# Patient Record
Sex: FEMALE | Race: WHITE | Employment: UNEMPLOYED
[De-identification: names, ages, dates, MRNs, and addresses within clinical notes are randomized per-mention and may not be internally consistent; named-entity substitution may affect disease eponyms.]

---

## 2017-01-18 ENCOUNTER — OFFICE VISIT (OUTPATIENT)
Dept: OBGYN | Facility: CLINIC | Age: 22
End: 2017-01-18

## 2017-01-18 VITALS
SYSTOLIC BLOOD PRESSURE: 115 MMHG | BODY MASS INDEX: 29.95 KG/M2 | HEIGHT: 63 IN | WEIGHT: 169 LBS | HEART RATE: 78 BPM | DIASTOLIC BLOOD PRESSURE: 76 MMHG

## 2017-01-18 DIAGNOSIS — Z30.41 FAMILY PLANNING, BCP (BIRTH CONTROL PILLS) MAINTENANCE: Primary | ICD-10-CM

## 2017-01-18 PROCEDURE — 99212 OFFICE O/P EST SF 10 MIN: CPT | Performed by: NURSE PRACTITIONER

## 2017-01-18 RX ORDER — NORETHINDRONE ACETATE AND ETHINYL ESTRADIOL 1; .02 MG/1; MG/1
1 TABLET ORAL DAILY
Qty: 21 TABLET | Refills: 12 | Status: SHIPPED | OUTPATIENT
Start: 2017-01-18 | End: 2018-01-25 | Stop reason: SDUPTHER

## 2018-01-25 DIAGNOSIS — Z30.41 FAMILY PLANNING, BCP (BIRTH CONTROL PILLS) MAINTENANCE: ICD-10-CM

## 2018-01-25 RX ORDER — NORETHINDRONE ACETATE AND ETHINYL ESTRADIOL 1; .02 MG/1; MG/1
1 TABLET ORAL DAILY
Qty: 21 TABLET | Refills: 0 | Status: SHIPPED | OUTPATIENT
Start: 2018-01-25 | End: 2018-02-02 | Stop reason: SDUPTHER

## 2018-02-02 ENCOUNTER — OFFICE VISIT (OUTPATIENT)
Dept: OBGYN CLINIC | Age: 23
End: 2018-02-02
Payer: MEDICARE

## 2018-02-02 VITALS
HEART RATE: 76 BPM | SYSTOLIC BLOOD PRESSURE: 122 MMHG | DIASTOLIC BLOOD PRESSURE: 80 MMHG | WEIGHT: 221 LBS | BODY MASS INDEX: 39.16 KG/M2 | HEIGHT: 63 IN

## 2018-02-02 DIAGNOSIS — Z01.419 WELL WOMAN EXAM: Primary | ICD-10-CM

## 2018-02-02 DIAGNOSIS — Z30.41 FAMILY PLANNING, BCP (BIRTH CONTROL PILLS) MAINTENANCE: ICD-10-CM

## 2018-02-02 PROCEDURE — 99395 PREV VISIT EST AGE 18-39: CPT | Performed by: NURSE PRACTITIONER

## 2018-02-02 RX ORDER — NORETHINDRONE ACETATE AND ETHINYL ESTRADIOL 1; .02 MG/1; MG/1
1 TABLET ORAL DAILY
Qty: 28 TABLET | Refills: 11 | Status: SHIPPED | OUTPATIENT
Start: 2018-02-02 | End: 2018-12-31 | Stop reason: SDUPTHER

## 2018-02-02 ASSESSMENT — PATIENT HEALTH QUESTIONNAIRE - PHQ9
SUM OF ALL RESPONSES TO PHQ QUESTIONS 1-9: 0
2. FEELING DOWN, DEPRESSED OR HOPELESS: 0
SUM OF ALL RESPONSES TO PHQ9 QUESTIONS 1 & 2: 0
1. LITTLE INTEREST OR PLEASURE IN DOING THINGS: 0

## 2018-02-02 NOTE — PROGRESS NOTES
Grandfather     Diabetes Paternal Grandmother     Diabetes Maternal Grandmother     COPD Maternal Grandmother     Hypertension Maternal Grandmother     Diabetes Maternal Grandfather     Coronary Art Dis Maternal Grandfather      Social History     Social History    Marital status: Single     Spouse name: N/A    Number of children: N/A    Years of education: N/A     Occupational History    Not on file. Social History Main Topics    Smoking status: Never Smoker    Smokeless tobacco: Never Used    Alcohol use No    Drug use: No    Sexual activity: Yes     Partners: Male     Other Topics Concern    Not on file     Social History Narrative    No narrative on file       MEDICATIONS:  Current Outpatient Prescriptions   Medication Sig Dispense Refill    norethindrone-ethinyl estradiol (MICROGESTIN) 1-20 MG-MCG per tablet Take 1 tablet by mouth daily 28 tablet 11    Multiple Vitamins-Calcium (ONE-A-DAY WOMENS PO) Take by mouth       No current facility-administered medications for this visit. ALLERGIES:  Allergies as of 02/02/2018    (No Known Allergies)       Symptoms of decreased mood absent  Symptoms of anhedonia absent    **If either question is answered in a  positive fashion then complete the PHQ9 Scoring Evaluation and make the appropriate referral**      Immunization status: stated as current, but no records available. Gynecologic History:  Menarche: 13 yo  Menopause at [address] [facility] yo     Patient's last menstrual period was 01/04/2018 (exact date). Sexually Active: Yes    STD History: Yes chlamydia X 1     Permanent Sterilization: No   Reversible Birth Control: Yes OCPs        Hormone Replacement Exposure: No      Genetic Qualified Family History of Breast, Ovarian , Colon or Uterine Cancer: No     If YES see scanned worksheet.     Preventative Health Testing:    Health Maintenance:  Health Maintenance Due   Topic Date Due    DTaP/Tdap/Td vaccine (1 - Tdap) 09/20/2012    Chlamydia screen  07/11/2014    Flu vaccine (1) 09/01/2017    Cervical cancer screen  10/13/2017       Date of Last Pap Smear: 10/2016 negative  Abnormal Pap Smear History: denies  Colposcopy History:   Date of Last Mammogram: NA  Date of Last Colonoscopy:   Date of Last Bone Density:      ________________________________________________________________________        REVIEW OF SYSTEMS:    yes   A minimum of an eleven point review of systems was completed. Review Of Systems (11 point):  Constitutional: No fever, chills or malaise; No weight change or fatigue  Head and Eyes: No vision, Headache, Dizziness or trauma in last 12 months  ENT ROS: No hearing, Tinnitis, sinus or taste problems  Hematological and Lymphatic ROS:No Lymphoma, Von Willebrand's, Hemophillia or Bleeding History  Psych ROS: No Depression, Homicidal thoughts,suicidal thoughts, or anxiety  Breast ROS: No prior breast abnormalities or lumps  Respiratory ROS: No SOB, Pneumoniae,Cough, or Pulmonary Embolism History  Cardiovascular ROS: No Chest Pain with Exertion, Palpitations, Syncope, Edema, Arrhythmia  Gastrointestinal ROS: No Indigestion, Heartburn, Nausea, vomiting, Diarrhea, Constipation,or Bowel Changes; No Bloody Stools or melena  Genito-Urinary ROS: No Dysuria, Hematuria or Nocturia.  No Urinary Incontinence or Vaginal Discharge  Musculoskeletal ROS: No Arthralgia, Arthritis,Gout,Osteoporosis or Rheumatism  Neurological ROS: No CVA, Migraines, Epilepsy, Seizure Hx, or Limb Weakness  Dermatological ROS: No Rash, Itching, Hives, Mole Changes or Cancer                                                                                                                                                                                                                                  PHYSICAL Exam:     Constitutional:  Vitals:    02/02/18 1137   BP: 122/80   Site: Right Arm   Position: Sitting   Cuff Size: Medium Adult   Pulse: 76   Weight: 221 lb (100.2 kg) Bimanual exam reveals normal uterus and adnexa. Rectal Exam:  exam declined by patient          Musculosk:  Normal Gait and station was noted. Digits were evaluated without abnormal findings. Range of motion, stability and strength were evaluated and found to be appropriate for the patients age. ASSESSMENT:      23 y.o. Annual  1. Well woman exam     2. Family planning, BCP (birth control pills) maintenance  norethindrone-ethinyl estradiol (Perlie Graven) 1-20 MG-MCG per tablet          Chief Complaint   Patient presents with    Gynecologic Exam          Past Medical History:   Diagnosis Date    Headache     History of chlamydia     Hyperglycemia     Obesity          Patient Active Problem List    Diagnosis Date Noted    H/O:  2013     Priority: High     Repeat C/S  Low transverse      Type O blood, Rh negative 2012     Priority: High    Headache     Hyperglycemia     Obesity     Low Platelet Count 2013     Platelets 623 on  repeat in 1 month  PFA wnl            Hereditary Breast, Ovarian, Colon and Uterine Cancer screening Done. Tobacco & Secondary smoke risks reviewed; instructed on cessation and avoidance      Counseling Completed:  Preventative Health Recommendations and Follow up. The patient was informed of the recommended preventative health recommendations. 1. Annuals every year; Cytology collections per prevailing guidelines. 2. Mammograms begin every year at 35 yo if no abnormalities are found and no family     History. 3. Bone density studies every 2-3 years. Begin at 73 yo. If no fracture history or osteoporosis family history. (significant). 4. Colonoscopy begin at 49 yo. Repeat every ten years if negative and no family history. 5. Calcium of 0320-7071 mg/day in split dosing  6. Vitamin D 400-800 IU/day  7.  All other preventative health recommendations will be managed by the patients Primary care

## 2018-02-09 ENCOUNTER — HOSPITAL ENCOUNTER (OUTPATIENT)
Age: 23
Setting detail: SPECIMEN
Discharge: HOME | End: 2018-02-09
Payer: MEDICARE

## 2018-02-09 ENCOUNTER — OFFICE VISIT (OUTPATIENT)
Dept: FAMILY MEDICINE CLINIC | Age: 23
End: 2018-02-09
Payer: MEDICARE

## 2018-02-09 VITALS
HEIGHT: 63 IN | SYSTOLIC BLOOD PRESSURE: 115 MMHG | TEMPERATURE: 98.2 F | DIASTOLIC BLOOD PRESSURE: 82 MMHG | WEIGHT: 223 LBS | BODY MASS INDEX: 39.51 KG/M2 | HEART RATE: 97 BPM | OXYGEN SATURATION: 98 %

## 2018-02-09 DIAGNOSIS — Z00.00 PREVENTATIVE HEALTH CARE: ICD-10-CM

## 2018-02-09 DIAGNOSIS — H53.9 VISUAL DISTURBANCE: ICD-10-CM

## 2018-02-09 DIAGNOSIS — E66.9 OBESITY, CLASS II, BMI 35-39.9: Primary | ICD-10-CM

## 2018-02-09 DIAGNOSIS — Z23 NEED FOR TETANUS BOOSTER: ICD-10-CM

## 2018-02-09 DIAGNOSIS — Z83.3 FAMILY HISTORY OF DIABETES MELLITUS: ICD-10-CM

## 2018-02-09 PROBLEM — E66.812 OBESITY, CLASS II, BMI 35-39.9: Status: ACTIVE | Noted: 2018-02-09

## 2018-02-09 LAB
ALBUMIN SERPL-MCNC: 4.3 G/DL (ref 3.5–5.2)
ALBUMIN/GLOBULIN RATIO: 1.6 (ref 1–2.5)
ALP BLD-CCNC: 66 U/L (ref 35–104)
ALT SERPL-CCNC: 13 U/L (ref 5–33)
ANION GAP SERPL CALCULATED.3IONS-SCNC: 13 MMOL/L (ref 9–17)
AST SERPL-CCNC: 15 U/L
BILIRUB SERPL-MCNC: 0.24 MG/DL (ref 0.3–1.2)
BUN BLDV-MCNC: 7 MG/DL (ref 6–20)
BUN/CREAT BLD: ABNORMAL (ref 9–20)
CALCIUM SERPL-MCNC: 9.3 MG/DL (ref 8.6–10.4)
CHLORIDE BLD-SCNC: 104 MMOL/L (ref 98–107)
CO2: 26 MMOL/L (ref 20–31)
CREAT SERPL-MCNC: 0.44 MG/DL (ref 0.5–0.9)
GFR AFRICAN AMERICAN: >60 ML/MIN
GFR NON-AFRICAN AMERICAN: >60 ML/MIN
GFR SERPL CREATININE-BSD FRML MDRD: ABNORMAL ML/MIN/{1.73_M2}
GFR SERPL CREATININE-BSD FRML MDRD: ABNORMAL ML/MIN/{1.73_M2}
GLUCOSE BLD-MCNC: 79 MG/DL (ref 70–99)
HBV SURFACE AB TITR SER: 315.4 MIU/ML
HCT VFR BLD CALC: 42.6 % (ref 36.3–47.1)
HEMOGLOBIN: 13.8 G/DL (ref 11.9–15.1)
MCH RBC QN AUTO: 27.4 PG (ref 25.2–33.5)
MCHC RBC AUTO-ENTMCNC: 32.4 G/DL (ref 28.4–34.8)
MCV RBC AUTO: 84.5 FL (ref 82.6–102.9)
NRBC AUTOMATED: 0 PER 100 WBC
PDW BLD-RTO: 13.2 % (ref 11.8–14.4)
PLATELET # BLD: 242 K/UL (ref 138–453)
PMV BLD AUTO: 10.4 FL (ref 8.1–13.5)
POTASSIUM SERPL-SCNC: 4.3 MMOL/L (ref 3.7–5.3)
RBC # BLD: 5.04 M/UL (ref 3.95–5.11)
RUBV IGG SER QL: 105.3 IU/ML
SODIUM BLD-SCNC: 143 MMOL/L (ref 135–144)
TOTAL PROTEIN: 7 G/DL (ref 6.4–8.3)
WBC # BLD: 8.5 K/UL (ref 3.5–11.3)

## 2018-02-09 PROCEDURE — 90715 TDAP VACCINE 7 YRS/> IM: CPT | Performed by: NURSE PRACTITIONER

## 2018-02-09 PROCEDURE — 90471 IMMUNIZATION ADMIN: CPT | Performed by: NURSE PRACTITIONER

## 2018-02-09 PROCEDURE — 99385 PREV VISIT NEW AGE 18-39: CPT | Performed by: NURSE PRACTITIONER

## 2018-02-09 PROCEDURE — 86580 TB INTRADERMAL TEST: CPT | Performed by: NURSE PRACTITIONER

## 2018-02-09 ASSESSMENT — ENCOUNTER SYMPTOMS
RHINORRHEA: 0
SHORTNESS OF BREATH: 0
EYE DISCHARGE: 0
SINUS PRESSURE: 0
WHEEZING: 0
COUGH: 0
ABDOMINAL PAIN: 0
BLOOD IN STOOL: 0

## 2018-02-09 NOTE — PROGRESS NOTES
[facility] [address] [place] [id number]  Dept: [phone]  Dept Fax: [phone]    Jorge A Orlando is a [employer] y.o. female who presents today for her medical conditions/complaints as noted below. Jorge A Orlando is c/o of Employment Physical (for school) and Eye Problem (black spots. started in december)        HPI:     Patient presents with:  Employment Physical: for school  Eye Problem: black spots. started in December  Saw eye dr , told no problems with vision. Last month. Changes in field . Wears glasses. Past Medical History:   Diagnosis Date    Headache     History of chlamydia     Hyperglycemia     Obesity       Past Surgical History:   Procedure Laterality Date     SECTION      x 2        Family History   Problem Relation Age of Onset    Depression Mother     Anxiety Disorder Mother     Diabetes Paternal Grandfather     Diabetes Paternal Grandmother     Diabetes Maternal Grandmother     COPD Maternal Grandmother     Hypertension Maternal Grandmother     Diabetes Maternal Grandfather     Coronary Art Dis Maternal Grandfather     Breast Cancer Neg Hx     Cancer Neg Hx     Colon Cancer Neg Hx     Eclampsia Neg Hx     Ovarian Cancer Neg Hx      Labor Neg Hx     Spont Abortions Neg Hx     Stroke Neg Hx        Social History   Substance Use Topics    Smoking status: Never Smoker    Smokeless tobacco: Never Used    Alcohol use No      Current Outpatient Prescriptions   Medication Sig Dispense Refill    Tdap (ADACEL) 5-2-15.5 LF-MCG/0.5 injection Inject 0.5 mLs into the muscle once for 1 dose 0.5 mL 0    norethindrone-ethinyl estradiol (MICROGESTIN) 1-20 MG-MCG per tablet Take 1 tablet by mouth daily 28 tablet 11    Multiple Vitamins-Calcium (ONE-A-DAY WOMENS PO) Take by mouth       No current facility-administered medications for this visit.       No Known Allergies      Subjective:      Review of Systems Constitutional: Negative for activity change, chills, fatigue and fever. HENT: Negative for congestion, postnasal drip, rhinorrhea and sinus pressure. Eyes: Positive for visual disturbance. Negative for discharge. Respiratory: Negative for cough, shortness of breath and wheezing. Cardiovascular: Negative for chest pain and leg swelling. Gastrointestinal: Negative for abdominal pain and blood in stool. Endocrine: Negative for cold intolerance and heat intolerance. Paps done. Genitourinary: Negative for dysuria, flank pain and hematuria. Musculoskeletal: Negative for arthralgias, joint swelling and myalgias. Skin: Negative for pallor and rash. Neurological: Positive for headaches. Psychiatric/Behavioral: Negative for dysphoric mood, hallucinations and suicidal ideas. The patient is not nervous/anxious. Objective:     Physical Exam   Constitutional: She is oriented to person, place, and time. She appears well-developed and well-nourished. HENT:   Head: Normocephalic and atraumatic. Eyes: Conjunctivae and EOM are normal. Right eye exhibits no discharge. Left eye exhibits no discharge. Right conjunctiva is not injected. Right conjunctiva has no hemorrhage. Left conjunctiva is not injected. Left conjunctiva has no hemorrhage. No scleral icterus. Right eye exhibits normal extraocular motion and no nystagmus. Left eye exhibits normal extraocular motion and no nystagmus. Neck: Normal range of motion. Neck supple. Carotid bruit is not present. No tracheal deviation present. No thyromegaly present. Cardiovascular: Normal rate, regular rhythm and normal heart sounds. Pulmonary/Chest: Effort normal and breath sounds normal.   Abdominal: Soft. Bowel sounds are normal. She exhibits no distension and no mass. There is no tenderness. There is no rebound and no guarding. Musculoskeletal: Normal range of motion.    Upper/lower extremity strength 5/5      Lymphadenopathy:        Head Expiration Date:   2/9/2019    Comprehensive Metabolic Panel     Standing Status:   Future     Standing Expiration Date:   2/9/2019    CBC     Standing Status:   Future     Standing Expiration Date:   2/9/2019     Orders Placed This Encounter   Medications    Tdap (ADACEL) 5-2-15.5 LF-MCG/0.5 injection     Sig: Inject 0.5 mLs into the muscle once for 1 dose     Dispense:  0.5 mL     Refill:  0       Patient given verbal and/or written educational instructions. Follow up as directed. I have reviewed and agree with the nursing documentation.     Electronically signed by Supa Carney NP on 2/9/2018 at 11:03 AM

## 2018-02-11 LAB
ESTIMATED AVERAGE GLUCOSE: 91 MG/DL
HBA1C MFR BLD: 4.8 % (ref 4–6)

## 2018-02-12 ENCOUNTER — OFFICE VISIT (OUTPATIENT)
Dept: FAMILY MEDICINE CLINIC | Age: 23
End: 2018-02-12

## 2018-02-12 DIAGNOSIS — Z11.1 ENCOUNTER FOR PPD SKIN TEST READING: ICD-10-CM

## 2018-02-13 ENCOUNTER — TELEPHONE (OUTPATIENT)
Dept: FAMILY MEDICINE CLINIC | Age: 23
End: 2018-02-13

## 2018-02-13 LAB
MEASLES IMMUNE (IGG): 0.65
MUV IGG SER QL: 2.2
VZV IGG SER QL IA: 1.86

## 2018-02-13 NOTE — TELEPHONE ENCOUNTER
It was only a few hours, not a full day late, it is ok. pls remind her she needs second PPD by Fri. Also, I passed ppwk to [facility] as her measles titer abn, will need vacc and poss 2nd titer, - will need to clarify with her school if 2nd titer needed or just proof of recent vacc  Thank you.

## 2018-02-13 NOTE — TELEPHONE ENCOUNTER
pt called asking if the TB test from yesterday was acceptable because she was late. I see the result is in the computer but I don't want to tell her anything wrong. SHe also wanted to know if the second step PPD will be done this friday or not? Please call pt and lwt her know!  Thank You

## 2018-02-16 ENCOUNTER — OFFICE VISIT (OUTPATIENT)
Dept: FAMILY MEDICINE CLINIC | Age: 23
End: 2018-02-16
Payer: MEDICARE

## 2018-02-16 DIAGNOSIS — Z23 NEED FOR TUBERCULOSIS VACCINATION: Primary | ICD-10-CM

## 2018-02-16 PROCEDURE — 86580 TB INTRADERMAL TEST: CPT | Performed by: NURSE PRACTITIONER

## 2018-02-19 ENCOUNTER — NURSE ONLY (OUTPATIENT)
Dept: FAMILY MEDICINE CLINIC | Age: 23
End: 2018-02-19

## 2018-02-19 ENCOUNTER — TELEPHONE (OUTPATIENT)
Dept: FAMILY MEDICINE CLINIC | Age: 23
End: 2018-02-19

## 2018-02-19 DIAGNOSIS — Z11.1 ENCOUNTER FOR PPD SKIN TEST READING: Primary | ICD-10-CM

## 2018-02-19 NOTE — PROGRESS NOTES
PPD Reading Note 2nd  step  PPD read and results entered in [facility]. Result: 0 mm induration. Interpretation:neg  I.   Allergic reaction: no

## 2018-02-22 NOTE — TELEPHONE ENCOUNTER
Pt called and stated [facility] for her measle vaccination. She stated she wanted the message relayed to you. Thank you.

## 2018-09-27 ENCOUNTER — NURSE ONLY (OUTPATIENT)
Dept: FAMILY MEDICINE CLINIC | Age: 23
End: 2018-09-27
Payer: MEDICARE

## 2018-09-27 DIAGNOSIS — Z23 NEED FOR INFLUENZA VACCINATION: Primary | ICD-10-CM

## 2018-09-27 PROCEDURE — 90471 IMMUNIZATION ADMIN: CPT | Performed by: NURSE PRACTITIONER

## 2018-09-27 PROCEDURE — 90688 IIV4 VACCINE SPLT 0.5 ML IM: CPT | Performed by: NURSE PRACTITIONER

## 2018-09-27 NOTE — PROGRESS NOTES
After obtaining consent, and per orders of Obinna Cain CNP, injection of IM flu given in Right deltoid by Shona Wilson. Patient instructed to remain in clinic for 20 minutes afterwards, and to report any adverse reaction to me immediately.

## 2018-12-31 DIAGNOSIS — Z30.41 FAMILY PLANNING, BCP (BIRTH CONTROL PILLS) MAINTENANCE: ICD-10-CM

## 2018-12-31 RX ORDER — NORETHINDRONE ACETATE AND ETHINYL ESTRADIOL 1; .02 MG/1; MG/1
TABLET ORAL
Qty: 21 TABLET | Refills: 1 | Status: SHIPPED | OUTPATIENT
Start: 2018-12-31 | End: 2019-02-05 | Stop reason: SDUPTHER

## 2019-02-05 DIAGNOSIS — Z30.41 FAMILY PLANNING, BCP (BIRTH CONTROL PILLS) MAINTENANCE: ICD-10-CM

## 2019-02-05 RX ORDER — NORETHINDRONE ACETATE AND ETHINYL ESTRADIOL 1; .02 MG/1; MG/1
TABLET ORAL
Qty: 21 TABLET | Refills: 1 | Status: SHIPPED | OUTPATIENT
Start: 2019-02-05 | End: 2019-04-23 | Stop reason: SDUPTHER

## 2019-04-23 ENCOUNTER — TELEPHONE (OUTPATIENT)
Dept: OBGYN CLINIC | Age: 24
End: 2019-04-23

## 2019-04-23 DIAGNOSIS — Z30.41 FAMILY PLANNING, BCP (BIRTH CONTROL PILLS) MAINTENANCE: ICD-10-CM

## 2019-04-23 RX ORDER — NORETHINDRONE ACETATE AND ETHINYL ESTRADIOL 1; .02 MG/1; MG/1
TABLET ORAL
Qty: 21 TABLET | Refills: 1 | Status: SHIPPED | OUTPATIENT
Start: 2019-04-23 | End: 2019-06-03 | Stop reason: SDUPTHER

## 2019-06-03 DIAGNOSIS — Z30.41 FAMILY PLANNING, BCP (BIRTH CONTROL PILLS) MAINTENANCE: ICD-10-CM

## 2019-06-03 RX ORDER — NORETHINDRONE ACETATE AND ETHINYL ESTRADIOL 1; .02 MG/1; MG/1
TABLET ORAL
Qty: 21 TABLET | Refills: 1 | Status: SHIPPED | OUTPATIENT
Start: 2019-06-03 | End: 2019-06-17 | Stop reason: SDUPTHER

## 2019-06-17 ENCOUNTER — OFFICE VISIT (OUTPATIENT)
Dept: OBGYN CLINIC | Age: 24
End: 2019-06-17
Payer: MEDICARE

## 2019-06-17 ENCOUNTER — HOSPITAL ENCOUNTER (OUTPATIENT)
Age: 24
Setting detail: SPECIMEN
Discharge: HOME | End: 2019-06-17
Payer: MEDICARE

## 2019-06-17 VITALS
DIASTOLIC BLOOD PRESSURE: 88 MMHG | SYSTOLIC BLOOD PRESSURE: 126 MMHG | WEIGHT: 242 LBS | BODY MASS INDEX: 42.88 KG/M2 | HEART RATE: 96 BPM | HEIGHT: 63 IN

## 2019-06-17 DIAGNOSIS — Z01.419 WOMEN'S ANNUAL ROUTINE GYNECOLOGICAL EXAMINATION: Primary | ICD-10-CM

## 2019-06-17 DIAGNOSIS — Z01.419 WOMEN'S ANNUAL ROUTINE GYNECOLOGICAL EXAMINATION: ICD-10-CM

## 2019-06-17 DIAGNOSIS — Z30.41 FAMILY PLANNING, BCP (BIRTH CONTROL PILLS) MAINTENANCE: ICD-10-CM

## 2019-06-17 PROCEDURE — G0145 SCR C/V CYTO,THINLAYER,RESCR: HCPCS

## 2019-06-17 PROCEDURE — 99395 PREV VISIT EST AGE 18-39: CPT | Performed by: NURSE PRACTITIONER

## 2019-06-17 RX ORDER — NORETHINDRONE ACETATE AND ETHINYL ESTRADIOL 1; .02 MG/1; MG/1
TABLET ORAL
Qty: 21 TABLET | Refills: 12 | Status: SHIPPED | OUTPATIENT
Start: 2019-06-17 | End: 2020-06-22

## 2019-06-17 ASSESSMENT — PATIENT HEALTH QUESTIONNAIRE - PHQ9
2. FEELING DOWN, DEPRESSED OR HOPELESS: 0
SUM OF ALL RESPONSES TO PHQ9 QUESTIONS 1 & 2: 0
SUM OF ALL RESPONSES TO PHQ QUESTIONS 1-9: 0
1. LITTLE INTEREST OR PLEASURE IN DOING THINGS: 0
SUM OF ALL RESPONSES TO PHQ QUESTIONS 1-9: 0

## 2019-06-17 NOTE — PATIENT INSTRUCTIONS
Patient Education        Combination Birth Control Pills: Care Instructions  Your Care Instructions    Combination birth control pills are used to prevent pregnancy. They give you a regular dose of the hormones estrogen and progestin. You take a hormone pill every day to prevent pregnancy. Birth control pills come in packs. The most common type has 3 weeks of hormone pills. Some packs have sugar pills (they do not contain any hormones) for the fourth week. During that fourth no-hormone week, you have your period. After the fourth week (28 days), you start a new pack. Some birth control pills are packaged in different ways. For example, some have hormone pills for the fourth week instead of sugar pills. Taking hormones for the entire month causes you to not have periods or to have fewer periods. Others are packaged so that you have a period every 3 months. Your doctor will tell you what type of pills you have. Follow-up care is a key part of your treatment and safety. Be sure to make and go to all appointments, and call your doctor if you are having problems. It's also a good idea to know your test results and keep a list of the medicines you take. How can you care for yourself at home? How do you take the pill? · Follow your doctor's instructions about when to start taking your pills. Use backup birth control, such as a condom, or don't have intercourse for 7 days after you start your pills. · Take your pills every day, at about the same time of day. To help yourself do this, try to take them when you do something else every day, such as brushing your teeth. What if you forget to take a pill? Always read the label for specific instructions, or call your doctor. Here are some basic guidelines:  · If you miss 1 hormone pill, take it as soon as you remember. Ask your doctor if you may need to use a backup birth control method, such as a condom, or not have intercourse.   · If you miss 2 or more hormone pills, take one as soon as you remember you forgot them. Then read the pill label or call your doctor about instructions on how to take your missed pills. Use a backup method of birth control or don't have intercourse for 7 days. Pregnancy is more likely if you miss more than 1 pill. · If you had intercourse, you can use emergency contraception, such as the morning-after pill (Plan B). You can use emergency contraception for up to 5 days after having had intercourse, but it works best if you take it right away. What else do you need to know? · The pill has side effects. ? You may have very light or skipped periods. ? You may have bleeding between periods (spotting). This usually decreases after 3 to 4 months. ? You may have mood changes, less interest in sex, or weight gain. · The pill may reduce acne, heavy bleeding and cramping, and symptoms of premenstrual syndrome. · Check with your doctor before you use any other medicines, including over-the-counter medicines, vitamins, herbal products, and supplements. Birth control hormones may not work as well to prevent pregnancy when combined with other medicines. · The pill doesn't protect against sexually transmitted infection (STIs), such as herpes or HIV/AIDS. If you're not sure whether your sex partner might have an STI, use a condom to protect against disease. When should you call for help? Call your doctor now or seek immediate medical care if:    · You have severe belly pain.     · You have signs of a blood clot, such as:  ? Pain in your calf, back of the knee, thigh, or groin. ?  Redness and swelling in your leg or groin.     · You have blurred vision or other problems seeing.     · You have a severe headache.     · You have severe trouble breathing.    Watch closely for changes in your health, and be sure to contact your doctor if:    · You think you might be pregnant.     · You think you may be depressed.     · You think you may have been exposed to or have a sexually transmitted infection. Where can you learn more? Go to [email or web address][email or web address] org and sign in to your [employer] account. Enter S691 in the [facility] box to learn more about \"Combination Birth Control Pills: Care Instructions. \"     If you do not have an account, please click on the \"Sign Up Now\" link. Current as of: September 5, 2018  Content Version: 12.0  © 2051-5253 [employer]. Care instructions adapted under license by [facility] ([facility]). If you have questions about a medical condition or this instruction, always ask your healthcare professional. [address] any warranty or liability for your use of this information.

## 2019-06-17 NOTE — PROGRESS NOTES
Paternal Grandfather     Diabetes Paternal Grandmother     Diabetes Maternal Grandmother     COPD Maternal Grandmother     Hypertension Maternal Grandmother     Diabetes Maternal Grandfather     Coronary Art Dis Maternal Grandfather     Breast Cancer Neg Hx     Cancer Neg Hx     Colon Cancer Neg Hx     Eclampsia Neg Hx     Ovarian Cancer Neg Hx      Labor Neg Hx     Spont Abortions Neg Hx     Stroke Neg Hx      Social History     Socioeconomic History    Marital status: Single     Spouse name: Not on file    Number of children: Not on file    Years of education: Not on file    Highest education level: Not on file   Occupational History    Not on file   Social Needs    Financial resource strain: Not on file    Food insecurity:     Worry: Not on file     Inability: Not on file    Transportation needs:     Medical: Not on file     Non-medical: Not on file   Tobacco Use    Smoking status: Never Smoker    Smokeless tobacco: Never Used   Substance and Sexual Activity    Alcohol use: No    Drug use: No    Sexual activity: Yes     Partners: Male     Birth control/protection: Pill   Lifestyle    Physical activity:     Days per week: Not on file     Minutes per session: Not on file    Stress: Not on file   Relationships    Social connections:     Talks on phone: Not on file     Gets together: Not on file     Attends Jewish service: Not on file     Active member of club or organization: Not on file     Attends meetings of clubs or organizations: Not on file     Relationship status: Not on file    Intimate partner violence:     Fear of current or ex partner: Not on file     Emotionally abused: Not on file     Physically abused: Not on file     Forced sexual activity: Not on file   Other Topics Concern    Not on file   Social History Narrative    Not on file       MEDICATIONS:  Current Outpatient Medications   Medication Sig Dispense Refill    norethindrone-ethinyl estradiol (MICROGESTIN 1/20) 1-20 MG-MCG per tablet take 1 tablet by mouth once daily 21 tablet 12    Multiple Vitamins-Calcium (ONE-A-DAY WOMENS PO) Take by mouth       No current facility-administered medications for this visit. ALLERGIES:  Allergies as of 06/17/2019    (No Known Allergies)       Symptoms of decreased mood absent  Symptoms of anhedonia absent    **If either question is answered in a  positive fashion then complete the PHQ9 Scoring Evaluation and make the appropriate referral**      Immunization status: stated as current, but no records available. Gynecologic History:  Menarche: 13 yo  Menopause at [place] yo     Patient's last menstrual period was 05/29/2019 (exact date). Sexually Active: Yes    STD History: Yes chlamydia X 1     Permanent Sterilization: No   Reversible Birth Control: Yes OCPs        Hormone Replacement Exposure: No      Genetic Qualified Family History of Breast, Ovarian , Colon or Uterine Cancer: No     If YES see scanned worksheet. Preventative Health Testing:    Health Maintenance:  Health Maintenance Due   Topic Date Due    Varicella Vaccine (1 of 2 - 13+ 2-dose series) 09/20/2006    HPV vaccine (1 - Female 3-dose series) 09/20/2008    Cervical cancer screen  10/13/2017       Date of Last Pap Smear: 10/2016 negative  Abnormal Pap Smear History: denies  Colposcopy History:   Date of Last Mammogram: NA  Date of Last Colonoscopy:   Date of Last Bone Density:      ________________________________________________________________________        REVIEW OF SYSTEMS:    yes   A minimum of an eleven point review of systems was completed. Review Of Systems (11 point):  Constitutional: No fever, chills or malaise;  No weight change or fatigue  Head and Eyes: No vision, Headache, Dizziness or trauma in last 12 months  ENT ROS: No hearing, Tinnitis, sinus or taste problems  Hematological and Lymphatic ROS:No Lymphoma, Von Willebrand's, Hemophillia or Bleeding History  Psych ROS: No Depression, Homicidal thoughts,suicidal thoughts, or anxiety  Breast ROS: No prior breast abnormalities or lumps  Respiratory ROS: No SOB, Pneumoniae,Cough, or Pulmonary Embolism History  Cardiovascular ROS: No Chest Pain with Exertion, Palpitations, Syncope, Edema, Arrhythmia  Gastrointestinal ROS: No Indigestion, Heartburn, Nausea, vomiting, Diarrhea, Constipation,or Bowel Changes; No Bloody Stools or melena  Genito-Urinary ROS: No Dysuria, Hematuria or Nocturia. No Urinary Incontinence or Vaginal Discharge  Musculoskeletal ROS: No Arthralgia, Arthritis,Gout,Osteoporosis or Rheumatism  Neurological ROS: No CVA, Migraines, Epilepsy, Seizure Hx, or Limb Weakness  Dermatological ROS: No Rash, Itching, Hives, Mole Changes or Cancer                                                                                                                                                                                                                                  PHYSICAL Exam:     Constitutional:  Vitals:    06/17/19 1441   BP: 126/88   Site: Left Upper Arm   Position: Sitting   Cuff Size: Medium Adult   Pulse: 96   Weight: 242 lb (109.8 kg)   Height: 5' 3\" (1.6 m)         General Appearance: This  is a well Developed, well Nourished, well groomed female. Her BMI was reviewed. Nutritional decision making was discussed. Skin:  There was a Normal Inspection of the skin without rashes or lesions. There were no rashes. (Papular, Maculopapular, Hives, Pustular, Macular)     There were no lesions (Ulcers, Erythema, Abn. Appearing Nevi)            Lymphatic:  No Lymph Nodes were Palpable in the neck , axilla or groin.  0 # Of Lymph Nodes; Location ; Character [Normal]  [Shotty] [Tender] [Enlarged]     Neck and EENT:  The neck was supple. There were no masses   The thyroid was not enlarged and had no masses. Perrla, EOMI B/L, TMI B/L No Abnormalities.    Throat inspected-No exudates or Masses, Nares Patent No Masses        Respiratory: The lungs were auscultated and found to be clear. There were no rales, rhonchi or wheezes. There was a good respiratory effort. Cardiovascular: The heart was in a regular rate and rhythm. . No S3 or S4. There was no murmur appreciated. Location, grade, and radiation are not applicable. Extremities: The patients extremities were without calf tenderness, edema, or varicosities. There was full range of motion in all four extremities. Pulses in all four extremities were appreciated and are 2/4. Abdomen: The abdomen was soft and non-tender. There were good bowel sounds in all quadrants and there was no guarding, rebound or rigidity. On evaluation there was no evidence of hepatosplenomegaly and there was no costal vertebral johnathon tenderness bilaterally. No hernias were appreciated. Abdominal Scars: C/S scar    Psych: The patient had a normal Orientation to: Time, Place, Person, and Situation  There is no Mood / Affect changes    Breast:  (Chest)  normal appearance, no masses or tenderness  Self breast exams were reviewed in detail. Literature was given. Pelvic Exam:  Vulva and vagina appear normal. Bimanual exam reveals normal uterus and adnexa. Rectal Exam:  exam declined by patient          Musculosk:  Normal Gait and station was noted. Digits were evaluated without abnormal findings. Range of motion, stability and strength were evaluated and found to be appropriate for the patients age. ASSESSMENT:      20 y. o. Annual   Diagnosis Orders   1. Women's annual routine gynecological examination  PAP SMEAR   2.  Family planning, BCP (birth control pills) maintenance  norethindrone-ethinyl estradiol (Fadi Richard 1/20) 1-20 MG-MCG per tablet          Chief Complaint   Patient presents with    Gynecologic Exam          Past Medical History:   Diagnosis Date    Headache     History of chlamydia     Hyperglycemia     Obesity          Patient Active Problem were reviewed. The patient was instructed to use barrier contraception for sexually transmitted disease prevention. The patient was also informed of antibiotics decreasing contraceptive efficacy and the need for barrier contraception from the onset of her antibiotic dosing and through a minimum of thirty days from antibiotic cessation. The life threatening side effect profile was reviewed in detail this includes but is not limited to shortness of breath, chest pain, severe or persistent headaches, or calf pain. If any of these occur the patient has been instructed to stop using her hormonal based contraception, notify the office, and go to the emergency department or call 911. The patient denied any personal history of blood clots in her leg, lung, or heart and denied any family history of stroke, TIA, sudden cardiac death < 36 y.o.,pulmonary embolism, or deep venous thrombosis. PLAN:  Return in about 1 year (around 2020) for annual.   Patient denies any personal or family history of blood clots or sudden cardiac death prior to age 36. Signs hormonal birth control consent form. Prescription refilled for Microgestin . Pap smear collected. Repeat Annual every 1 year  Cervical Cytology Evaluation begins at 24years old. If Negative Cytology, Follow-up screening per current guidelines. Mammograms every 1 year. If 37 yo and last mammogram was negative. Calcium and Vitamin D dosing reviewed. Colonoscopy screening reviewed as well as onset for bone density testing. Birth control and barrier recommendations discussed. STD counseling and prevention reviewed. Gardisil counseling completed for all patients 7-35 yo. Routine health maintenance per patients PCP. Orders Placed This Encounter   Procedures    PAP SMEAR     Patient History:    Patient's last menstrual period was 2019 (exact date).   OBGYN Status: Having periods  Past Surgical History:  No date:  SECTION Comment:  x 2   Medications/Contraceptives Affecting Cytology     Combination Contraceptives - Oral Disp Start End     norethindrone-ethinyl estradiol (MICROGESTIN 1/20) 1-20 MG-MCG per   tablet    21 tablet 6/17/2019     Sig: take 1 tablet by mouth once daily     Problem List       Edg Problems Affecting Cytology    RESOLVED: H/O chlamydia infection    Overview Signed 8/8/2013 10:43 AM by EVIN Gonzalez CNM     7/2013 GC and Chlamydia cultures negative        Social History    Tobacco Use      Smoking status: Never Smoker      Smokeless tobacco: Never Used       Standing Status:   Future     Standing Expiration Date:   6/16/2020     Order Specific Question:   Collection Type     Answer: Thin Prep     Order Specific Question:   Prior Abnormal Pap Test     Answer:   No     Order Specific Question:   Screening or Diagnostic     Answer:   Screening     Order Specific Question:   HPV Requested?      Answer:   Yes -  If ASCUS Reflex HPV     Order Specific Question:   High Risk Patient     Answer:   N/A

## 2019-06-20 ENCOUNTER — TELEPHONE (OUTPATIENT)
Dept: OBGYN CLINIC | Age: 24
End: 2019-06-20

## 2019-06-20 LAB — CYTOLOGY REPORT: NORMAL

## 2019-06-20 NOTE — TELEPHONE ENCOUNTER
Attempted to reach patient in regards to negative pap smear with positive BV results. Per provider, Flagyl to be ordered. Patient did not answer, voicemail left, awaiting call back at this time.

## 2019-06-20 NOTE — TELEPHONE ENCOUNTER
----- Message from EVIN Samaniego CNP sent at 6/20/2019 12:38 PM EDT -----  Pap smear neg  AGE 23  +BV on pap smear  Flagyl 500mg PO BID x 7 days

## 2019-06-26 ENCOUNTER — TELEPHONE (OUTPATIENT)
Dept: OBGYN CLINIC | Age: 24
End: 2019-06-26

## 2019-06-26 RX ORDER — METRONIDAZOLE 500 MG/1
500 TABLET ORAL 2 TIMES DAILY
Qty: 14 TABLET | Refills: 0 | Status: SHIPPED | OUTPATIENT
Start: 2019-06-26 | End: 2019-07-03

## 2019-06-26 NOTE — TELEPHONE ENCOUNTER
Second attempt made to reach patient in regards to test results and medication treatment, as requested by Gela Barnett CNP. Patient's mother answered phone stating that patient is at work. Message left, patient to return call to office.

## 2019-06-26 NOTE — TELEPHONE ENCOUNTER
Spoke with patient in regards to negative pap, with positive BV. Per provider, flagyl 500mg PO BID x 7 days ordered. Patient verbalized an understanding of results and plan of care.

## 2019-06-26 NOTE — TELEPHONE ENCOUNTER
----- Message from EVIN Rojas CNP sent at 6/20/2019 12:38 PM EDT -----  Pap smear neg  AGE 23  +BV on pap smear  Flagyl 500mg PO BID x 7 days

## 2019-06-26 NOTE — TELEPHONE ENCOUNTER
----- Message from EVIN Ghosh CNP sent at 6/20/2019 12:38 PM EDT -----  Pap smear neg  AGE 23  +BV on pap smear  Flagyl 500mg PO BID x 7 days

## 2020-06-22 RX ORDER — NORETHINDRONE ACETATE AND ETHINYL ESTRADIOL 1; .02 MG/1; MG/1
TABLET ORAL
Qty: 21 TABLET | Refills: 1 | Status: SHIPPED | OUTPATIENT
Start: 2020-06-22 | End: 2020-08-24 | Stop reason: SDUPTHER

## 2020-06-30 ENCOUNTER — OFFICE VISIT (OUTPATIENT)
Dept: PRIMARY CARE CLINIC | Age: 25
End: 2020-06-30
Payer: MEDICARE

## 2020-06-30 VITALS
WEIGHT: 216.4 LBS | HEART RATE: 110 BPM | HEIGHT: 64 IN | SYSTOLIC BLOOD PRESSURE: 122 MMHG | TEMPERATURE: 97.9 F | DIASTOLIC BLOOD PRESSURE: 80 MMHG | BODY MASS INDEX: 36.95 KG/M2 | OXYGEN SATURATION: 98 %

## 2020-06-30 PROCEDURE — G8427 DOCREV CUR MEDS BY ELIG CLIN: HCPCS | Performed by: NURSE PRACTITIONER

## 2020-06-30 PROCEDURE — 99203 OFFICE O/P NEW LOW 30 MIN: CPT | Performed by: NURSE PRACTITIONER

## 2020-06-30 PROCEDURE — G8431 POS CLIN DEPRES SCRN F/U DOC: HCPCS | Performed by: NURSE PRACTITIONER

## 2020-06-30 PROCEDURE — 96160 PT-FOCUSED HLTH RISK ASSMT: CPT | Performed by: NURSE PRACTITIONER

## 2020-06-30 PROCEDURE — 1036F TOBACCO NON-USER: CPT | Performed by: NURSE PRACTITIONER

## 2020-06-30 PROCEDURE — G8417 CALC BMI ABV UP PARAM F/U: HCPCS | Performed by: NURSE PRACTITIONER

## 2020-06-30 RX ORDER — ZINC OXIDE 13 %
CREAM (GRAM) TOPICAL
Qty: 30 CAPSULE | Refills: 2 | COMMUNITY
Start: 2020-06-30

## 2020-06-30 RX ORDER — CALCIUM CARBONATE/VITAMIN D3 500-10/5ML
LIQUID (ML) ORAL
Qty: 30 CAPSULE | Refills: 2 | Status: SHIPPED | OUTPATIENT
Start: 2020-06-30 | End: 2020-10-26

## 2020-06-30 RX ORDER — ESCITALOPRAM OXALATE 10 MG/1
10 TABLET ORAL DAILY
Qty: 30 TABLET | Refills: 1 | Status: SHIPPED | OUTPATIENT
Start: 2020-06-30 | End: 2020-08-13 | Stop reason: DRUGHIGH

## 2020-06-30 ASSESSMENT — ENCOUNTER SYMPTOMS
COUGH: 0
SHORTNESS OF BREATH: 0
SORE THROAT: 0
DIARRHEA: 0
ABDOMINAL DISTENTION: 0
SINUS PRESSURE: 0
EYE DISCHARGE: 0
BACK PAIN: 0
NAUSEA: 0
EYE ITCHING: 0
CONSTIPATION: 0
RHINORRHEA: 0
EYE REDNESS: 0
WHEEZING: 0
SINUS PAIN: 0
VOMITING: 0
BLOOD IN STOOL: 0
ABDOMINAL PAIN: 0

## 2020-06-30 ASSESSMENT — PATIENT HEALTH QUESTIONNAIRE - PHQ9
9. THOUGHTS THAT YOU WOULD BE BETTER OFF DEAD, OR OF HURTING YOURSELF: 0
4. FEELING TIRED OR HAVING LITTLE ENERGY: 2
7. TROUBLE CONCENTRATING ON THINGS, SUCH AS READING THE NEWSPAPER OR WATCHING TELEVISION: 1
1. LITTLE INTEREST OR PLEASURE IN DOING THINGS: 2
8. MOVING OR SPEAKING SO SLOWLY THAT OTHER PEOPLE COULD HAVE NOTICED. OR THE OPPOSITE, BEING SO FIGETY OR RESTLESS THAT YOU HAVE BEEN MOVING AROUND A LOT MORE THAN USUAL: 0
SUM OF ALL RESPONSES TO PHQ9 QUESTIONS 1 & 2: 4
2. FEELING DOWN, DEPRESSED OR HOPELESS: 2
SUM OF ALL RESPONSES TO PHQ QUESTIONS 1-9: 11
5. POOR APPETITE OR OVEREATING: 2
6. FEELING BAD ABOUT YOURSELF - OR THAT YOU ARE A FAILURE OR HAVE LET YOURSELF OR YOUR FAMILY DOWN: 0
3. TROUBLE FALLING OR STAYING ASLEEP: 2
SUM OF ALL RESPONSES TO PHQ QUESTIONS 1-9: 11

## 2020-06-30 NOTE — PATIENT INSTRUCTIONS
Patient Education        Depression Treatment: Care Instructions  Your Care Instructions     Depression is a condition that affects the way you feel, think, and act. It causes symptoms such as low energy, loss of interest in daily activities, and sadness or grouchiness that goes on for a long time. Depression is very common and affects men and women of all ages. Depression is a medical illness caused by changes in the natural chemicals in your brain. It is not a character flaw, and it does not mean that you are a bad or weak person. It does not mean that you are going crazy. It is important to know that depression can be treated. Medicines, counseling, and self-care can all help. Many people do not get help because they are embarrassed or think that they will get over the depression on their own. But some people do not get better without treatment. Follow-up care is a key part of your treatment and safety. Be sure to make and go to all appointments, and call your doctor if you are having problems. It's also a good idea to know your test results and keep a list of the medicines you take. How can you care for yourself at home? Learn about antidepressant medicines  Antidepressant medicines can improve or end the symptoms of depression. You may need to take the medicine for at least 6 months, and often longer. Keep taking your medicine even if you feel better. If you stop taking it too soon, your symptoms may come back or get worse. You may start to feel better within 1 to 3 weeks of taking antidepressant medicine. But it can take as many as 6 to 8 weeks to see more improvement. Talk to your doctor if you have problems with your medicine or if you do not notice any improvement after 3 weeks. Antidepressants can make you feel tired, dizzy, or nervous. Some people have dry mouth, constipation, headaches, sexual problems, an upset stomach, or diarrhea.  Many of these side effects are mild and go away on their own

## 2020-07-02 ENCOUNTER — HOSPITAL ENCOUNTER (OUTPATIENT)
Age: 25
Discharge: HOME | End: 2020-07-02
Payer: MEDICARE

## 2020-07-02 LAB
ABSOLUTE EOS #: 0.1 K/UL (ref 0–0.4)
ABSOLUTE IMMATURE GRANULOCYTE: ABNORMAL K/UL (ref 0–0.3)
ABSOLUTE LYMPH #: 1.5 K/UL (ref 1–4.8)
ABSOLUTE MONO #: 0.5 K/UL (ref 0.1–1.3)
ALBUMIN SERPL-MCNC: 4.2 G/DL (ref 3.5–5.2)
ALBUMIN/GLOBULIN RATIO: NORMAL (ref 1–2.5)
ALP BLD-CCNC: 69 U/L (ref 35–104)
ALT SERPL-CCNC: 18 U/L (ref 5–33)
ANION GAP SERPL CALCULATED.3IONS-SCNC: 12 MMOL/L (ref 9–17)
AST SERPL-CCNC: 18 U/L
BASOPHILS # BLD: 1 % (ref 0–2)
BASOPHILS ABSOLUTE: 0.1 K/UL (ref 0–0.2)
BILIRUB SERPL-MCNC: 0.5 MG/DL (ref 0.3–1.2)
BUN BLDV-MCNC: 8 MG/DL (ref 6–20)
BUN/CREAT BLD: NORMAL (ref 9–20)
CALCIUM SERPL-MCNC: 9.2 MG/DL (ref 8.6–10.4)
CHLORIDE BLD-SCNC: 102 MMOL/L (ref 98–107)
CHOLESTEROL/HDL RATIO: 3.8
CHOLESTEROL: 168 MG/DL
CO2: 24 MMOL/L (ref 20–31)
CREAT SERPL-MCNC: 0.58 MG/DL (ref 0.5–0.9)
DIFFERENTIAL TYPE: ABNORMAL
EOSINOPHILS RELATIVE PERCENT: 1 % (ref 0–4)
GFR AFRICAN AMERICAN: >60 ML/MIN
GFR NON-AFRICAN AMERICAN: >60 ML/MIN
GFR SERPL CREATININE-BSD FRML MDRD: NORMAL ML/MIN/{1.73_M2}
GFR SERPL CREATININE-BSD FRML MDRD: NORMAL ML/MIN/{1.73_M2}
GLUCOSE BLD-MCNC: 92 MG/DL (ref 70–99)
HCT VFR BLD CALC: 41.8 % (ref 36–46)
HDLC SERPL-MCNC: 44 MG/DL
HEMOGLOBIN: 14.2 G/DL (ref 12–16)
IMMATURE GRANULOCYTES: ABNORMAL %
LDL CHOLESTEROL: 86 MG/DL (ref 0–130)
LYMPHOCYTES # BLD: 18 % (ref 24–44)
MAGNESIUM: 2.1 MG/DL (ref 1.6–2.6)
MCH RBC QN AUTO: 28.3 PG (ref 26–34)
MCHC RBC AUTO-ENTMCNC: 34 G/DL (ref 31–37)
MCV RBC AUTO: 83.1 FL (ref 80–100)
MONOCYTES # BLD: 6 % (ref 1–7)
NRBC AUTOMATED: ABNORMAL PER 100 WBC
PDW BLD-RTO: 13.5 % (ref 11.5–14.9)
PLATELET # BLD: 237 K/UL (ref 150–450)
PLATELET ESTIMATE: ABNORMAL
PMV BLD AUTO: 7.8 FL (ref 6–12)
POTASSIUM SERPL-SCNC: 4.2 MMOL/L (ref 3.7–5.3)
RBC # BLD: 5.03 M/UL (ref 4–5.2)
RBC # BLD: ABNORMAL 10*6/UL
SEG NEUTROPHILS: 74 % (ref 36–66)
SEGMENTED NEUTROPHILS ABSOLUTE COUNT: 5.9 K/UL (ref 1.3–9.1)
SODIUM BLD-SCNC: 138 MMOL/L (ref 135–144)
TOTAL PROTEIN: 7.1 G/DL (ref 6.4–8.3)
TRIGL SERPL-MCNC: 189 MG/DL
TSH SERPL DL<=0.05 MIU/L-ACNC: 1 MIU/L (ref 0.3–5)
VITAMIN D 25-HYDROXY: 49.9 NG/ML (ref 30–100)
VLDLC SERPL CALC-MCNC: ABNORMAL MG/DL (ref 1–30)
WBC # BLD: 8.1 K/UL (ref 3.5–11)
WBC # BLD: ABNORMAL 10*3/UL

## 2020-07-02 PROCEDURE — 84443 ASSAY THYROID STIM HORMONE: CPT

## 2020-07-02 PROCEDURE — 83735 ASSAY OF MAGNESIUM: CPT

## 2020-07-02 PROCEDURE — 36415 COLL VENOUS BLD VENIPUNCTURE: CPT

## 2020-07-02 PROCEDURE — 82306 VITAMIN D 25 HYDROXY: CPT

## 2020-07-02 PROCEDURE — 80053 COMPREHEN METABOLIC PANEL: CPT

## 2020-07-02 PROCEDURE — 85025 COMPLETE CBC W/AUTO DIFF WBC: CPT

## 2020-07-02 PROCEDURE — 80061 LIPID PANEL: CPT

## 2020-07-06 ENCOUNTER — TELEPHONE (OUTPATIENT)
Dept: PRIMARY CARE CLINIC | Age: 25
End: 2020-07-06

## 2020-07-06 NOTE — TELEPHONE ENCOUNTER
Patient calling and states she has had the following side effects with escitalopram (LEXAPRO) 10 MG tablet:    -Nausea  -Diarreha  -Sweating   -Dry mouth   -More anxious     Taking since Tuesday full done and Friday/saturday only took half of pill still side effects and then she took half of a half and felt fine. She states she also quit the probiotic for now to see if that would help but still having nausea and diarrhea. She states she called off due to the side effects and is asking to have a work note for Friday and Sunday. RTW with no restrictions , also states she is getting a COVID test to just be safe. Please advise.

## 2020-07-07 NOTE — TELEPHONE ENCOUNTER
That is fine as long as her symptoms are controlled. Patient should not return back to work until [facility] testing results are finalized. She should check in with her employment HR to see if there are any special protocols or paperwork that need to be addressed/signed.

## 2020-07-07 NOTE — TELEPHONE ENCOUNTER
Pt notified. Was tested for covid yesterday and was told that she would get results in the next 2-4 days. Tested at [facility] on [address]. Also pt went down to 2.5mg on the Celexa. Is this okay for now?

## 2020-07-07 NOTE — TELEPHONE ENCOUNTER
It is likely the celexa that caused her symptoms. Unfortunately, antidepressants can do that. If she is getting COVID tested, she shouldn't return to work until her results are final. Where is she having the testing completed?

## 2020-08-10 ENCOUNTER — TELEPHONE (OUTPATIENT)
Dept: PRIMARY CARE CLINIC | Age: 25
End: 2020-08-10

## 2020-08-12 RX ORDER — MAGNESIUM OXIDE 400 MG/1
TABLET ORAL
COMMUNITY
Start: 2020-06-30 | End: 2020-08-12 | Stop reason: SDUPTHER

## 2020-08-13 ENCOUNTER — TELEMEDICINE (OUTPATIENT)
Dept: PRIMARY CARE CLINIC | Age: 25
End: 2020-08-13
Payer: MEDICARE

## 2020-08-13 PROCEDURE — G8427 DOCREV CUR MEDS BY ELIG CLIN: HCPCS | Performed by: NURSE PRACTITIONER

## 2020-08-13 PROCEDURE — 99213 OFFICE O/P EST LOW 20 MIN: CPT | Performed by: NURSE PRACTITIONER

## 2020-08-13 RX ORDER — ESCITALOPRAM OXALATE 10 MG/1
5 TABLET ORAL DAILY
Qty: 30 TABLET | Refills: 2 | Status: SHIPPED
Start: 2020-08-13 | End: 2020-10-26

## 2020-08-13 ASSESSMENT — ENCOUNTER SYMPTOMS
DIARRHEA: 0
WHEEZING: 0
COUGH: 0
NAUSEA: 0
VOMITING: 0
SHORTNESS OF BREATH: 0

## 2020-08-13 NOTE — PATIENT INSTRUCTIONS

## 2020-08-13 NOTE — PROGRESS NOTES
[address] [facility]  [address]  [facility] [address]  Dept: Chitra is a 30 y.o. female who presents today for her medical conditions/complaints as noted below. Chief Complaint   Patient presents with    Depression     6 week f/u, Pt states meds are working well.  Anxiety       HPI:     HPI Lu set up a virtual visit today for follow up on depression, anxiety and headaches. Overall, Lu is doing better. She is using 5 mg of Lexapro because the 10 was causing dry mouth, nausea and diarrhea. Anxiety is better controlled. No trouble sleeping or problems with confusion/agitation. Migraines are less. She is using magnesium and riboflavin daily. She still gets them, but they aren't as frequent, feels like overall things are doing much better. Patient denies fever, chills, body aches, coughing, sob, wheezing chest pains and heart palpitations.      LDL Cholesterol (mg/dL)   Date Value   2020 86       (goal LDL is <100)   AST (U/L)   Date Value   2020 18     ALT (U/L)   Date Value   2020 18     BUN (mg/dL)   Date Value   2020 8     BP Readings from Last 3 Encounters:   20 122/80   19 126/88   18 115/82          (goal 120/80)    Past Medical History:   Diagnosis Date    Headache     History of chlamydia     Hyperglycemia     Obesity       Past Surgical History:   Procedure Laterality Date     SECTION      x 2        Family History   Problem Relation Age of Onset    Depression Mother     Anxiety Disorder Mother     Diabetes Paternal Grandfather     Diabetes Paternal Grandmother     Diabetes Maternal Grandmother     COPD Maternal Grandmother     Hypertension Maternal Grandmother     Diabetes Maternal Grandfather     Coronary Art Dis Maternal Grandfather     Breast Cancer Neg Hx     Cancer Neg Hx     Colon Cancer Neg Hx     Eclampsia Neg Hx     Ovarian Cancer Neg Hx      Labor Neg Hx     Spont Abortions Neg Hx     Stroke Neg Hx        Social History     Tobacco Use    Smoking status: Never Smoker    Smokeless tobacco: Never Used   Substance Use Topics    Alcohol use: Yes     Comment: social      Current Outpatient Medications   Medication Sig Dispense Refill    Omega-3 Fatty Acids (FISH OIL ADULT GUMMIES PO) Take by mouth      escitalopram (LEXAPRO) 10 MG tablet Take 0.5 tablets by mouth daily 30 tablet 2    Magnesium Oxide 400 MG CAPS Take 1 capsule by mouth daily. 30 capsule 2    Riboflavin 100 MG CAPS Take 1 capsule by mouth daily. 30 capsule 2    Probiotic Product (PROBIOTIC DAILY) CAPS Take 1 capsule by mouth daily. 30 capsule 2    norethindrone-ethinyl estradiol (DOYLE ) 1-20 MG-MCG per tablet take 1 tablet by mouth once daily 21 tablet 1    Multiple Vitamins-Calcium (ONE-A-DAY WOMENS PO) Take by mouth       No current facility-administered medications for this visit. No Known Allergies    Health Maintenance   Topic Date Due    HPV vaccine (1 - 2-dose series) 2004    Cervical cancer screen  2020    Flu vaccine (1) 2020    DTaP/Tdap/Td vaccine (6 - Td) 2028    Varicella vaccine  Completed    HIV screen  Completed    Hepatitis A vaccine  Aged Out    Hepatitis B vaccine  Aged Out    Hib vaccine  Aged Out    Meningococcal (ACWY) vaccine  Aged Out    Pneumococcal 0-64 years Vaccine  Aged Out       Subjective:      Review of Systems   Constitutional: Negative for chills and fever. Respiratory: Negative for cough, shortness of breath and wheezing. Cardiovascular: Negative for chest pain and palpitations. Gastrointestinal: Negative for diarrhea, nausea and vomiting. Musculoskeletal: Negative for myalgias. Neurological: Positive for headaches (migraines). Negative for dizziness and light-headedness. Psychiatric/Behavioral: Negative for agitation, confusion, self-injury and sleep disturbance.  The care.  The patient (and/or legal guardian) has also been advised to contact this office for worsening conditions or problems, and seek emergency medical treatment and/or call 911 if deemed necessary. Patient identification was verified at the start of the visit: Yes    Total time spent for this encounter: 15 minutes    Services were provided through a video synchronous discussion virtually to substitute for in-person clinic visit. Patient and provider were located at their individual homes. --EVIN Cruz Che, CNP on 8/13/2020 at 1:34 PM    An electronic signature was used to authenticate this note. Return in about 3 months (around 11/13/2020) for f/u depression/anxiety. Orders Placed This Encounter   Medications    escitalopram (LEXAPRO) 10 MG tablet     Sig: Take 0.5 tablets by mouth daily     Dispense:  30 tablet     Refill:  2       Patient given educationalmaterials - see patient instructions. Discussed use, benefit, and side effectsof prescribed medications. All patient questions answered. Pt voiced understanding. Reviewed health maintenance. Instructed to continue current medications, diet andexercise. Patient agreed with treatment plan. Follow up as directed.      Electronicallysigned by EVIN Cruz Che, CNP on 8/13/2020 at 1:34 PM

## 2020-08-21 ENCOUNTER — NURSE TRIAGE (OUTPATIENT)
Dept: OTHER | Facility: CLINIC | Age: 25
End: 2020-08-21

## 2020-08-24 ENCOUNTER — HOSPITAL ENCOUNTER (OUTPATIENT)
Age: 25
Setting detail: SPECIMEN
Discharge: HOME | End: 2020-08-24
Payer: MEDICARE

## 2020-08-24 ENCOUNTER — OFFICE VISIT (OUTPATIENT)
Dept: OBGYN CLINIC | Age: 25
End: 2020-08-24
Payer: MEDICARE

## 2020-08-24 VITALS
HEIGHT: 63 IN | SYSTOLIC BLOOD PRESSURE: 128 MMHG | TEMPERATURE: 97.5 F | WEIGHT: 220 LBS | BODY MASS INDEX: 38.98 KG/M2 | DIASTOLIC BLOOD PRESSURE: 86 MMHG

## 2020-08-24 LAB
DIRECT EXAM: ABNORMAL
Lab: ABNORMAL
SPECIMEN DESCRIPTION: ABNORMAL

## 2020-08-24 PROCEDURE — 99395 PREV VISIT EST AGE 18-39: CPT | Performed by: NURSE PRACTITIONER

## 2020-08-24 PROCEDURE — 87480 CANDIDA DNA DIR PROBE: CPT

## 2020-08-24 PROCEDURE — 87510 GARDNER VAG DNA DIR PROBE: CPT

## 2020-08-24 PROCEDURE — 87660 TRICHOMONAS VAGIN DIR PROBE: CPT

## 2020-08-24 PROCEDURE — 87491 CHLMYD TRACH DNA AMP PROBE: CPT

## 2020-08-24 PROCEDURE — 87591 N.GONORRHOEAE DNA AMP PROB: CPT

## 2020-08-24 RX ORDER — NORETHINDRONE ACETATE AND ETHINYL ESTRADIOL 1; .02 MG/1; MG/1
TABLET ORAL
Qty: 28 TABLET | Refills: 12 | Status: SHIPPED | OUTPATIENT
Start: 2020-08-24 | End: 2021-11-03 | Stop reason: SDUPTHER

## 2020-08-24 ASSESSMENT — PATIENT HEALTH QUESTIONNAIRE - PHQ9
SUM OF ALL RESPONSES TO PHQ QUESTIONS 1-9: 0
1. LITTLE INTEREST OR PLEASURE IN DOING THINGS: 0
SUM OF ALL RESPONSES TO PHQ9 QUESTIONS 1 & 2: 0
SUM OF ALL RESPONSES TO PHQ QUESTIONS 1-9: 0
2. FEELING DOWN, DEPRESSED OR HOPELESS: 0

## 2020-08-24 NOTE — PROGRESS NOTES
History and Physical  [address], [address] [facility] [address], [address] [facility] [address] [phone]   Fax [phone]  Padmini Chan  2020              30 y.o. Chief Complaint   Patient presents with    Gynecologic Exam       Patient's last menstrual period was 2020. Primary Care Physician: EVIN Young - CNP    The patient was seen and examined. She has no chief complaint today and is here for her annual exam.  Her bowels are regular. There are no voiding complaints. She denies any bloating. She denies vaginal discharge and was counseled on STD's and the need for barrier contraception. HPI : Padmini Chan is a 30 y.o. female     Annual exam  Desires to continue on OCPs  Desires STD testing. Complaining of occasional vaginal discharge and concerns about current partner being unfaithful. Vaginal dryness-currently on Lexapro 5mg daily. ________________________________________________________________________  OB History    Para Term  AB Living   2 2 2 0 0 2   SAB TAB Ectopic Molar Multiple Live Births   0 0 0 0 0 2      # Outcome Date GA Lbr Jf/2nd Weight Sex Delivery Anes PTL Lv   2 Term 14 38w2d  6 lb 15.5 oz (3.161 kg) M CS-LTranv Spinal N ASHLEY      Birth Comments: (+) GBS, low platelet count, excessive fetal growth      Apgar1: 9  Apgar5: 9   1 Term 12 39w3d  7 lb 7 oz (3.374 kg) F CS-LTranv   ASHLEY      Birth Comments: System Generated. Please review and update pregnancy details.       Name: Linda Media     Past Medical History:   Diagnosis Date    Headache     History of chlamydia     Hyperglycemia     Obesity                                                                    Past Surgical History:   Procedure Laterality Date     SECTION      x 2      Family History   Problem Relation Age of Onset    Depression Mother     Anxiety Disorder Mother     Diabetes Paternal Grandfather     Diabetes Paternal Grandmother     Diabetes Maternal Grandmother     COPD Maternal Grandmother     Hypertension Maternal Grandmother     Diabetes Maternal Grandfather     Coronary Art Dis Maternal Grandfather     Breast Cancer Neg Hx     Cancer Neg Hx     Colon Cancer Neg Hx     Eclampsia Neg Hx     Ovarian Cancer Neg Hx      Labor Neg Hx     Spont Abortions Neg Hx     Stroke Neg Hx      Social History     Socioeconomic History    Marital status: Single     Spouse name: Not on file    Number of children: Not on file    Years of education: Not on file    Highest education level: Not on file   Occupational History    Not on file   Social Needs    Financial resource strain: Not on file    Food insecurity     Worry: Not on file     Inability: Not on file    Transportation needs     Medical: Not on file     Non-medical: Not on file   Tobacco Use    Smoking status: Never Smoker    Smokeless tobacco: Never Used   Substance and Sexual Activity    Alcohol use: Yes     Comment: social    Drug use: Yes     Frequency: 4.0 times per week     Types: Marijuana    Sexual activity: Yes     Partners: Male     Birth control/protection: Pill   Lifestyle    Physical activity     Days per week: Not on file     Minutes per session: Not on file    Stress: Not on file   Relationships    Social connections     Talks on phone: Not on file     Gets together: Not on file     Attends [employer] service: Not on file     Active member of club or organization: Not on file     Attends meetings of clubs or organizations: Not on file     Relationship status: Not on file    Intimate partner violence     Fear of current or ex partner: Not on file     Emotionally abused: Not on file     Physically abused: Not on file     Forced sexual activity: Not on file   Other Topics Concern    Not on file   Social History Narrative    Not on file       MEDICATIONS:  Current Outpatient Medications   Medication Sig Dispense Refill    norethindrone-ethinyl estradiol (DOYLE 1/20) 1-20 MG-MCG per tablet take 1 tablet by mouth once daily 28 tablet 12    Omega-3 Fatty Acids (FISH OIL ADULT GUMMIES PO) Take by mouth      escitalopram (LEXAPRO) 10 MG tablet Take 0.5 tablets by mouth daily 30 tablet 2    Magnesium Oxide 400 MG CAPS Take 1 capsule by mouth daily. 30 capsule 2    Riboflavin 100 MG CAPS Take 1 capsule by mouth daily. 30 capsule 2    Probiotic Product (PROBIOTIC DAILY) CAPS Take 1 capsule by mouth daily. 30 capsule 2    Multiple Vitamins-Calcium (ONE-A-DAY WOMENS PO) Take by mouth       No current facility-administered medications for this visit. ALLERGIES:  Allergies as of 08/24/2020    (No Known Allergies)       Symptoms of decreased mood absent  Symptoms of anhedonia absent    **If either question is answered in a  positive fashion then complete the PHQ9 Scoring Evaluation and make the appropriate referral**      Immunization status: up to date and documented, stated as current, but no records available. Gynecologic History:  Menarche: 13 yo  Menopause at [address] [facility] yo     Patient's last menstrual period was 06/24/2020. Sexually Active: Yes    STD History: Yes hx chlamydia     Permanent Sterilization: No   Reversible Birth Control: Yes OCPs        Hormone Replacement Exposure: No      Genetic Qualified Family History of Breast, Ovarian , Colon or Uterine Cancer: No     If YES see scanned worksheet.     Preventative Health Testing:    Health Maintenance:  Health Maintenance Due   Topic Date Due    HPV vaccine (1 - 2-dose series) 09/20/2004    Cervical cancer screen  06/17/2020       Date of Last Pap Smear: 6/17/2019 negative  Abnormal Pap Smear History: denies  Colposcopy History:   Date of Last Mammogram: NA  Date of Last Colonoscopy:   Date of Last Bone Density:      ________________________________________________________________________        REVIEW OF SYSTEMS:    yes   A minimum of an eleven point review of Lymphatic:  No Lymph Nodes were Palpable in the neck , axilla or groin.  0 # Of Lymph Nodes; Location ; Character [Normal]  [Shotty] [Tender] [Enlarged]     Neck and EENT:  The neck was supple. There were no masses   The thyroid was not enlarged and had no masses. Perrla, EOMI B/L, TMI B/L No Abnormalities. Throat inspected-No exudates or Masses, Nares Patent No Masses        Respiratory: The lungs were auscultated and found to be clear. There were no rales, rhonchi or wheezes. There was a good respiratory effort. Cardiovascular: The heart was in a regular rate and rhythm. . No S3 or S4. There was no murmur appreciated. Location, grade, and radiation are not applicable. Extremities: The patients extremities were without calf tenderness, edema, or varicosities. There was full range of motion in all four extremities. Pulses in all four extremities were appreciated and are 2/4. Abdomen: The abdomen was soft and non-tender. There were good bowel sounds in all quadrants and there was no guarding, rebound or rigidity. On evaluation there was no evidence of hepatosplenomegaly and there was no costal vertebral johnathon tenderness bilaterally. No hernias were appreciated. Abdominal Scars: C/S scar    Psych: The patient had a normal Orientation to: Time, Place, Person, and Situation  There is no Mood / Affect changes    Breast:  (Chest)  normal appearance, no masses or tenderness  Self breast exams were reviewed in detail. Literature was given. Pelvic Exam:  Vulva and vagina appear normal. Bimanual exam reveals normal uterus and adnexa. Rectal Exam:  exam declined by patient          Musculosk:  Normal Gait and station was noted. Digits were evaluated without abnormal findings. Range of motion, stability and strength were evaluated and found to be appropriate for the patients age. ASSESSMENT:      30 y.o. Annual   Diagnosis Orders   1.  Women's annual routine gynecological examination  norethindrone-ethinyl estradiol (DOYLE 1/20) 1-20 MG-MCG per tablet   2. Family planning, BCP (birth control pills) maintenance  norethindrone-ethinyl estradiol (DOYLE 1/20) 1-20 MG-MCG per tablet   3. Screening for STDs (sexually transmitted diseases)  C.trachomatis N.gonorrhoeae DNA    VAGINITIS DNA PROBE          Chief Complaint   Patient presents with    Gynecologic Exam          Past Medical History:   Diagnosis Date    Headache     History of chlamydia     Hyperglycemia     Obesity          Patient Active Problem List    Diagnosis Date Noted    H/O:  2013     Priority: High     Repeat C/S  Low transverse      Type O blood, Rh negative 2012     Priority: High    Obesity, Class II, BMI 35-39.9 2018    Family history of diabetes mellitus 2018    Headache     Hyperglycemia     Obesity     Low Platelet Count 2013     Platelets 544 on 3340 repeat in 1 month  PFA wnl            Hereditary Breast, Ovarian, Colon and Uterine Cancer screening Done. Tobacco & Secondary smoke risks reviewed; instructed on cessation and avoidance      Counseling Completed:  Preventative Health Recommendations and Follow up. The patient was informed of the recommended preventative health recommendations. 1. Annuals every year; Cytology collections per prevailing guidelines. 2. Mammograms begin every year at 35 yo if no abnormalities are found and no family history. 3. Bone density studies every 2-3 years. Begin at 73 yo. If no fracture history or osteoporosis family history. (significant). 4. Colonoscopy begin at 38 yo. Repeat every ten years if negative and no family history. 5. Calcium of 2994-4965 mg/day in split dosing  6. Vitamin D 400-800 IU/day  7. All other preventative health recommendations will be managed by the patients Primary care physician.     Counseling Hormonal Based Birth Control:      The patient was seen and counseled on all forms of birth control both male and female  reversible and non. She is aware that hormonal based birth control may increase her risk of developing a blood clot which may increase her morbidity and or mortality. She was counseled on alternate non hormonal based contraception options. We discussed that smoking and any hormonal based contraception may increase the patients risks of developing these life threatening blood clots. All patients are encouraged to stop smoking at the time of contraceptive counseling. Cessation programs were reviewed. The patient was instructed to use barrier contraception for sexually transmitted disease prevention. The patient was also informed of antibiotics decreasing contraceptive efficacy and the need for barrier contraception from the onset of her antibiotic dosing and through a minimum of thirty days from antibiotic cessation. The life threatening side effect profile was reviewed in detail this includes but is not limited to shortness of breath, chest pain, severe or persistent headaches, or calf pain. If any of these occur the patient has been instructed to stop using her hormonal based contraception, notify the office, and go to the emergency department or call 911. The patient denied any personal history of blood clots in her leg, lung, or heart and denied any family history of stroke, TIA, sudden cardiac death < 36 y.o.,pulmonary embolism, or deep venous thrombosis. PLAN:  Return in about 1 year (around 8/24/2021) for annual.   Vaginal cultures collected. Declines blood STD testing. Patient denies any personal or family history of blood clots or sudden cardiac death prior to age 36. Signs hormonal birth control consent form. Prescription refilled for OCPs  Discussed use of OTC lubricant with vaginal dryness. Repeat Annual every 1 year  Cervical Cytology Evaluation begins at 24years old.   If Negative Cytology, Follow-up screening per current guidelines. Mammograms every 1 year. If 35 yo and last mammogram was negative. Calcium and Vitamin D dosing reviewed. Colonoscopy screening reviewed as well as onset for bone density testing. Birth control and barrier recommendations discussed. STD counseling and prevention reviewed. Gardisil counseling completed for all patients 10-35 yo. Routine health maintenance per patients PCP.   Orders Placed This Encounter   Procedures    C.trachomatis N.gonorrhoeae DNA     Standing Status:   Future     Standing Expiration Date:   8/24/2021    VAGINITIS DNA PROBE     Standing Status:   Future     Standing Expiration Date:   8/24/2021

## 2020-08-25 ENCOUNTER — TELEPHONE (OUTPATIENT)
Dept: OBGYN CLINIC | Age: 25
End: 2020-08-25

## 2020-08-25 LAB
C TRACH DNA GENITAL QL NAA+PROBE: NEGATIVE
N. GONORRHOEAE DNA: NEGATIVE
SPECIMEN DESCRIPTION: NORMAL

## 2020-08-25 RX ORDER — METRONIDAZOLE 500 MG/1
500 TABLET ORAL 2 TIMES DAILY
Qty: 14 TABLET | Refills: 0 | Status: SHIPPED | OUTPATIENT
Start: 2020-08-25 | End: 2021-03-01 | Stop reason: SDUPTHER

## 2020-08-25 NOTE — TELEPHONE ENCOUNTER
Per Cortez Montoya CNP, patient advised of +BV with order for flagyl. Patient voiced an understanding of results and plan of care.

## 2020-08-25 NOTE — TELEPHONE ENCOUNTER
----- Message from EVIN Ricks - CNP sent at 8/25/2020  7:47 AM EDT -----  +BV- flagyl 500mg PO BID x 7 days

## 2020-08-25 NOTE — TELEPHONE ENCOUNTER
----- Message from EVIN Kilgore - CNP sent at 8/25/2020  7:47 AM EDT -----  +BV- flagyl 500mg PO BID x 7 days

## 2020-09-17 RX ORDER — MAGNESIUM OXIDE 400 MG/1
TABLET ORAL
COMMUNITY
Start: 2020-08-25 | End: 2020-09-17 | Stop reason: SDUPTHER

## 2020-10-26 RX ORDER — ESCITALOPRAM OXALATE 10 MG/1
TABLET ORAL
Qty: 30 TABLET | Refills: 2 | Status: SHIPPED | OUTPATIENT
Start: 2020-10-26 | End: 2021-02-09 | Stop reason: SDUPTHER

## 2020-10-26 RX ORDER — MAGNESIUM OXIDE 400 MG/1
TABLET ORAL
Qty: 30 TABLET | Refills: 2 | Status: SHIPPED | OUTPATIENT
Start: 2020-10-26

## 2020-11-03 PROBLEM — E66.9 OBESITY: Status: RESOLVED | Noted: 2020-11-03 | Resolved: 2020-11-03

## 2021-02-05 DIAGNOSIS — F41.9 ANXIETY AND DEPRESSION: ICD-10-CM

## 2021-02-05 DIAGNOSIS — F32.A ANXIETY AND DEPRESSION: ICD-10-CM

## 2021-02-05 RX ORDER — ESCITALOPRAM OXALATE 10 MG/1
TABLET ORAL
Qty: 30 TABLET | Refills: 2 | OUTPATIENT
Start: 2021-02-05

## 2021-02-05 NOTE — TELEPHONE ENCOUNTER
INCOMING FAX:    LVV  08/13/20-  Last rx 10/26/20-    RA in [address] [place] [address]     If needed see request scanned in media

## 2021-02-09 ENCOUNTER — TELEMEDICINE (OUTPATIENT)
Dept: PRIMARY CARE CLINIC | Age: 26
End: 2021-02-09
Payer: MEDICARE

## 2021-02-09 DIAGNOSIS — F41.9 ANXIETY AND DEPRESSION: ICD-10-CM

## 2021-02-09 DIAGNOSIS — F32.A ANXIETY AND DEPRESSION: ICD-10-CM

## 2021-02-09 PROCEDURE — G8417 CALC BMI ABV UP PARAM F/U: HCPCS | Performed by: PHYSICIAN ASSISTANT

## 2021-02-09 PROCEDURE — 1036F TOBACCO NON-USER: CPT | Performed by: PHYSICIAN ASSISTANT

## 2021-02-09 PROCEDURE — G8427 DOCREV CUR MEDS BY ELIG CLIN: HCPCS | Performed by: PHYSICIAN ASSISTANT

## 2021-02-09 PROCEDURE — 99213 OFFICE O/P EST LOW 20 MIN: CPT | Performed by: PHYSICIAN ASSISTANT

## 2021-02-09 PROCEDURE — G8484 FLU IMMUNIZE NO ADMIN: HCPCS | Performed by: PHYSICIAN ASSISTANT

## 2021-02-09 RX ORDER — ESCITALOPRAM OXALATE 10 MG/1
5 TABLET ORAL DAILY
Qty: 30 TABLET | Refills: 2 | Status: SHIPPED | OUTPATIENT
Start: 2021-02-09 | End: 2021-09-27

## 2021-02-09 ASSESSMENT — PATIENT HEALTH QUESTIONNAIRE - PHQ9
SUM OF ALL RESPONSES TO PHQ QUESTIONS 1-9: 0
SUM OF ALL RESPONSES TO PHQ9 QUESTIONS 1 & 2: 0
1. LITTLE INTEREST OR PLEASURE IN DOING THINGS: 0
2. FEELING DOWN, DEPRESSED OR HOPELESS: 0

## 2021-02-09 ASSESSMENT — ENCOUNTER SYMPTOMS
COUGH: 0
SHORTNESS OF BREATH: 0

## 2021-02-09 NOTE — PROGRESS NOTES
[address] [facility]  [address] [address]  [facility] [address]  Dept: Chitra is a 30 y.o. female who presents today for her medical conditions/complaintsas noted below. Chief Complaint   Patient presents with    Other     Patient agreed to a VV appointment     Medication Check       HPI:     HPI: The patient is needing a medication refill. She is not having side effects. She is taking 5 mg daily. It is working well. No thoughts of hurting self or others it is improving mood. No other medications need refilled.  She has been on the lexapro since august. She has increased anxiety at first.     LDL Cholesterol (mg/dL)   Date Value   2020 86       (goal LDL is <100)   AST (U/L)   Date Value   2020 18     ALT (U/L)   Date Value   2020 18     BUN (mg/dL)   Date Value   2020 8     BP Readings from Last 3 Encounters:   20 128/86   20 122/80   19 126/88          (goal 120/80)    Past Medical History:   Diagnosis Date    Headache     History of chlamydia     Hyperglycemia     Obesity       Past Surgical History:   Procedure Laterality Date     SECTION      x 2        Family History   Problem Relation Age of Onset    Depression Mother     Anxiety Disorder Mother     Diabetes Paternal Grandfather     Diabetes Paternal Grandmother     Diabetes Maternal Grandmother     COPD Maternal Grandmother     Hypertension Maternal Grandmother     Diabetes Maternal Grandfather     Coronary Art Dis Maternal Grandfather     Breast Cancer Neg Hx     Cancer Neg Hx     Colon Cancer Neg Hx     Eclampsia Neg Hx     Ovarian Cancer Neg Hx      Labor Neg Hx     Spont Abortions Neg Hx     Stroke Neg Hx        Social History     Tobacco Use    Smoking status: Never Smoker    Smokeless tobacco: Never Used   Substance Use Topics    Alcohol use: Yes     Comment: social Left Ear: External ear normal.   Eyes:      Conjunctiva/sclera: Conjunctivae normal.   Pulmonary:      Effort: No respiratory distress. Neurological:      General: No focal deficit present. Mental Status: She is alert and oriented to person, place, and time. Psychiatric:         Mood and Affect: Mood normal.         Behavior: Behavior normal.         Thought Content: Thought content normal.         Judgment: Judgment normal.         Assessment:       Diagnosis Orders   1. Anxiety and depression  escitalopram (LEXAPRO) 10 MG tablet        Plan:     No concerns with medication continue as is   Educated on increased risk for suicidal thoughts   Call office or 911 if these occur. Kelli Jasso is a 30 y.o. female being evaluated by a Virtual Visit (video visit) encounter to address concerns as mentioned above. A caregiver was present when appropriate. Due to this being a TeleHealth encounter ([facility]), evaluation of the following organ systems was limited: Vitals/Constitutional/EENT/Resp/CV/GI//MS/Neuro/Skin/Heme-Lymph-Imm. Pursuant to the emergency declaration under the [address] [place] [address] authority and the [employer] Act, this Virtual Visit was conducted with patient's (and/or legal guardian's) consent, to reduce the patient's risk of exposure to COVID-19 and provide necessary medical care. The patient (and/or legal guardian) has also been advised to contact this office for worsening conditions or problems, and seek emergency medical treatment and/or call 911 if deemed necessary. Patient identification was verified at the start of the visit: Yes    Total time spent for this encounter: 20    Services were provided through a video synchronous discussion virtually to substitute for in-person clinic visit. Patient and provider were located at their individual homes. --KACI Huddleston on 2/9/2021 at 2:04 PM    An electronic signature was used to authenticate this note. No follow-ups on file. No orders of the defined types were placed in this encounter. Orders Placed This Encounter   Medications    escitalopram (LEXAPRO) 10 MG tablet     Sig: Take 0.5 tablets by mouth daily     Dispense:  30 tablet     Refill:  2       Patient given educationalmaterials - see patient instructions. Discussed use, benefit, and side effectsof prescribed medications. All patient questions answered. Pt voiced understanding. Reviewed health maintenance. Instructed to continue current medications, diet andexercise. Patient agreed with treatment plan. Follow up as directed.      Electronicallysigned by KACI Huddleston on 2/9/2021 at 2:04 PM

## 2021-03-01 ENCOUNTER — TELEPHONE (OUTPATIENT)
Dept: OBGYN CLINIC | Age: 26
End: 2021-03-01

## 2021-03-01 RX ORDER — METRONIDAZOLE 500 MG/1
500 TABLET ORAL 2 TIMES DAILY
Qty: 14 TABLET | Refills: 0 | Status: SHIPPED | OUTPATIENT
Start: 2021-03-01 | End: 2021-03-08

## 2021-03-10 ENCOUNTER — OFFICE VISIT (OUTPATIENT)
Dept: OBGYN CLINIC | Age: 26
End: 2021-03-10
Payer: MEDICARE

## 2021-03-10 ENCOUNTER — HOSPITAL ENCOUNTER (OUTPATIENT)
Age: 26
Setting detail: SPECIMEN
Discharge: HOME | End: 2021-03-10
Payer: MEDICARE

## 2021-03-10 VITALS
SYSTOLIC BLOOD PRESSURE: 122 MMHG | DIASTOLIC BLOOD PRESSURE: 80 MMHG | HEIGHT: 63 IN | WEIGHT: 235.4 LBS | TEMPERATURE: 98.6 F | HEART RATE: 88 BPM | BODY MASS INDEX: 41.71 KG/M2

## 2021-03-10 DIAGNOSIS — N89.8 VAGINAL DISCHARGE: Primary | ICD-10-CM

## 2021-03-10 DIAGNOSIS — N89.8 VAGINAL DISCHARGE: ICD-10-CM

## 2021-03-10 LAB
DIRECT EXAM: NORMAL
Lab: NORMAL
SPECIMEN DESCRIPTION: NORMAL

## 2021-03-10 PROCEDURE — G8427 DOCREV CUR MEDS BY ELIG CLIN: HCPCS | Performed by: NURSE PRACTITIONER

## 2021-03-10 PROCEDURE — 87510 GARDNER VAG DNA DIR PROBE: CPT

## 2021-03-10 PROCEDURE — 87591 N.GONORRHOEAE DNA AMP PROB: CPT

## 2021-03-10 PROCEDURE — 99213 OFFICE O/P EST LOW 20 MIN: CPT | Performed by: NURSE PRACTITIONER

## 2021-03-10 PROCEDURE — 87660 TRICHOMONAS VAGIN DIR PROBE: CPT

## 2021-03-10 PROCEDURE — 1036F TOBACCO NON-USER: CPT | Performed by: NURSE PRACTITIONER

## 2021-03-10 PROCEDURE — G8417 CALC BMI ABV UP PARAM F/U: HCPCS | Performed by: NURSE PRACTITIONER

## 2021-03-10 PROCEDURE — 87480 CANDIDA DNA DIR PROBE: CPT

## 2021-03-10 PROCEDURE — 87491 CHLMYD TRACH DNA AMP PROBE: CPT

## 2021-03-10 PROCEDURE — G8484 FLU IMMUNIZE NO ADMIN: HCPCS | Performed by: NURSE PRACTITIONER

## 2021-03-10 NOTE — PROGRESS NOTES
Leandro Hyman  3/10/2021    YOB: 1995          The patient was seen today. She is here regarding vaginal discharge x 2 weeks. Denies odor, burning, itching, dysuria. Currently ending a relationship and desire STD screening. Will contact office for blood STD screening if she desires. . Her bowels are regular and she is voiding without difficulty. HPI:  Leandro Hyman is a 30 y.o. female  vaginal discharge x 2 weeks       OB History    Para Term  AB Living   2 2 2 0 0 2   SAB TAB Ectopic Molar Multiple Live Births   0 0 0 0 0 2      # Outcome Date GA Lbr Jf/2nd Weight Sex Delivery Anes PTL Lv   2 Term 14 38w2d  6 lb 15.5 oz (3.161 kg) M CS-LTranv Spinal N ASHLEY      Birth Comments: (+) GBS, low platelet count, excessive fetal growth      Apgar1: 9  Apgar5: 9   1 Term 12 39w3d  7 lb 7 oz (3.374 kg) F CS-LTranv   ASHLEY      Birth Comments: System Generated. Please review and update pregnancy details.       Name: Morris Meera       Past Medical History:   Diagnosis Date    Headache     History of chlamydia     Hyperglycemia     Obesity        Past Surgical History:   Procedure Laterality Date     SECTION      x 2        Family History   Problem Relation Age of Onset    Depression Mother     Anxiety Disorder Mother     Diabetes Paternal Grandfather     Diabetes Paternal Grandmother     Diabetes Maternal Grandmother     COPD Maternal Grandmother     Hypertension Maternal Grandmother     Diabetes Maternal Grandfather     Coronary Art Dis Maternal Grandfather     Breast Cancer Neg Hx     Cancer Neg Hx     Colon Cancer Neg Hx     Eclampsia Neg Hx     Ovarian Cancer Neg Hx      Labor Neg Hx     Spont Abortions Neg Hx     Stroke Neg Hx        Social History     Socioeconomic History    Marital status: Single     Spouse name: Not on file    Number of children: Not on file    Years of education: Not on file    Highest education level: Not on file   Occupational History    Not on file   Social Needs    Financial resource strain: Not on file    Food insecurity     Worry: Not on file     Inability: Not on file    Transportation needs     Medical: Not on file     Non-medical: Not on file   Tobacco Use    Smoking status: Never Smoker    Smokeless tobacco: Never Used   Substance and Sexual Activity    Alcohol use: Yes     Comment: social    Drug use: Yes     Frequency: 4.0 times per week     Types: Marijuana    Sexual activity: Yes     Partners: Male     Birth control/protection: Pill   Lifestyle    Physical activity     Days per week: Not on file     Minutes per session: Not on file    Stress: Not on file   Relationships    Social connections     Talks on phone: Not on file     Gets together: Not on file     Attends Latter-day service: Not on file     Active member of club or organization: Not on file     Attends meetings of clubs or organizations: Not on file     Relationship status: Not on file    Intimate partner violence     Fear of current or ex partner: Not on file     Emotionally abused: Not on file     Physically abused: Not on file     Forced sexual activity: Not on file   Other Topics Concern    Not on file   Social History Narrative    Not on file         MEDICATIONS:  Current Outpatient Medications   Medication Sig Dispense Refill    escitalopram (LEXAPRO) 10 MG tablet Take 0.5 tablets by mouth daily 30 tablet 2    magnesium oxide (MAG-OX) 400 MG tablet take 1 tablet by mouth once daily 30 tablet 2    vitamin B-2 (RIBOFLAVIN) 100 MG TABS tablet take 1 tablet by mouth once daily 30 tablet 2    norethindrone-ethinyl estradiol (DOYLE 1/20) 1-20 MG-MCG per tablet take 1 tablet by mouth once daily 28 tablet 12    Omega-3 Fatty Acids (FISH OIL ADULT GUMMIES PO) Take by mouth      Probiotic Product (PROBIOTIC DAILY) CAPS Take 1 capsule by mouth daily.  30 capsule 2    Multiple Vitamins-Calcium (ONE-A-DAY WOMENS PO) Take by mouth       No current facility-administered medications for this visit. ALLERGIES:  Allergies as of 03/10/2021    (No Known Allergies)         REVIEW OF SYSTEMS:    yes   A minimum of an eleven point review of systems was completed. Review Of Systems (11 point):  Constitutional: No fever, chills or malaise; No weight change or fatigue  Head and Eyes: No vision, Headache, Dizziness or trauma in last 12 months  ENT ROS: No hearing, Tinnitis, sinus or taste problems  Hematological and Lymphatic ROS:No Lymphoma, Von Willebrand's, Hemophillia or Bleeding History  Psych ROS: No Depression, Homicidal thoughts,suicidal thoughts, or anxiety  Breast ROS: No prior breast abnormalities or lumps  Respiratory ROS: No SOB, Pneumoniae,Cough, or Pulmonary Embolism History  Cardiovascular ROS: No Chest Pain with Exertion, Palpitations, Syncope, Edema, Arrhythmia  Gastrointestinal ROS: No Indigestion, Heartburn, Nausea, vomiting, Diarrhea, Constipation,or Bowel Changes; No Bloody Stools or melena  Genito-Urinary ROS: No Dysuria, Hematuria or Nocturia. No Urinary Incontinence or Vaginal Discharge  Musculoskeletal ROS: No Arthralgia, Arthritis,Gout,Osteoporosis or Rheumatism  Neurological ROS: No CVA, Migraines, Epilepsy, Seizure Hx, or Limb Weakness  Dermatological ROS: No Rash, Itching, Hives, Mole Changes or Cancer          Blood pressure 122/80, pulse 88, temperature 98.6 °F (37 °C), temperature source Temporal, height 5' 3\" (1.6 m), weight 235 lb 6.4 oz (106.8 kg), last menstrual period 03/02/2021, not currently breastfeeding. Chaperone for Intimate Exam   Chaperone was offered and accepted as part of the rooming process.  Chaperone: Celestina CABRERA         Abdomen: Soft non-tender; good bowel sounds. No guarding, rebound or rigidity. No CVA tenderness bilaterally.     Extremities: No calf tenderness, DTR 2/4, and No edema bilaterally    Pelvic: Vulva and vagina appear normal. Bimanual exam reveals normal uterus and adnexa. Diagnostics:  No results found. Lab Results:  Results for orders placed or performed during the hospital encounter of 20   VAGINITIS DNA PROBE    Specimen: Vaginal   Result Value Ref Range    Specimen Description . VAGINA     Special Requests NOT REPORTED     Direct Exam POSITIVE for Gardnerella vaginalis. (A)     Direct Exam NEGATIVE for Trichomonas vaginalis     Direct Exam NEGATIVE for Candida sp. Direct Exam       Method of testing is a DNA probe intended for detection and identification of Candida species, Gardnerella vaginalis, and Trichomonas vaginalis nucleic acid in vaginal fluid specimens from patients with symptoms of vaginitis/vaginosis. C.trachomatis N.gonorrhoeae DNA    Specimen: Cervix   Result Value Ref Range    Specimen Description . CERVIX     C. trachomatis DNA NEGATIVE NEGATIVE    N. gonorrhoeae DNA NEGATIVE NEGATIVE         Assessment:   Diagnosis Orders   1. Vaginal discharge  VAGINITIS DNA PROBE    C.trachomatis N.gonorrhoeae DNA     Patient Active Problem List    Diagnosis Date Noted    H/O:  2013     Priority: High     Repeat C/S  Low transverse      Type O blood, Rh negative 2012     Priority: High    Obesity, Class II, BMI 35-39.9 2018    Family history of diabetes mellitus 2018    Headache     Hyperglycemia     Low Platelet Count 2013     Platelets 481 on  repeat in 1 month  PFA wnl             PLAN:  Return in about 5 months (around 8/10/2021) for annual.  Vaginal cultures collected  Repeat Annual every 1 year  Cervical Cytology Evaluation begins at 22years old. If Negative Cytology, Follow-up screening per current guidelines. Return to the office in prn weeks. Counseled on preventative health maintenance follow-up.   Orders Placed This Encounter   Procedures    VAGINITIS DNA PROBE     Standing Status:   Future     Standing Expiration Date:   3/8/2022    C.trachomatis N.gonorrhoeae DNA Standing Status:   Future     Standing Expiration Date:   3/8/2022           The patient, Smitha Walter is a 30 y.o. female, was seen with a total time spent of 15 minutes for the visit on this date of service by the E/M provider. The time component had both face to face and non face to face time spent in determining the total time component. Counseling and education regarding her diagnosis listed below and her options regarding those diagnoses were also included in determining her time component. Diagnosis Orders   1. Vaginal discharge  VAGINITIS DNA PROBE    C.trachomatis N.gonorrhoeae DNA        The patient had her preventative health maintenance recommendations and follow-up reviewed with her at the completion of her visit.

## 2021-03-12 LAB
C TRACH DNA GENITAL QL NAA+PROBE: ABNORMAL
N. GONORRHOEAE DNA: NEGATIVE
SPECIMEN DESCRIPTION: ABNORMAL

## 2021-03-15 ENCOUNTER — TELEPHONE (OUTPATIENT)
Dept: OBGYN CLINIC | Age: 26
End: 2021-03-15

## 2021-03-15 DIAGNOSIS — A74.9 CHLAMYDIA: Primary | ICD-10-CM

## 2021-03-15 RX ORDER — AZITHROMYCIN 500 MG/1
1000 TABLET, FILM COATED ORAL ONCE
Qty: 2 TABLET | Refills: 0 | Status: SHIPPED | OUTPATIENT
Start: 2021-03-15 | End: 2021-03-15

## 2021-03-15 NOTE — TELEPHONE ENCOUNTER
LM for pt to call office regarding +chlamydia. Pt will need azithromycin 1gm po/partner needs tx'd/abstain/JERRELL will need scheduled.

## 2021-03-15 NOTE — TELEPHONE ENCOUNTER
----- Message from EVIN Caraballo NP sent at 3/15/2021  8:13 AM EDT -----  + Chlamydia   Azithromycin 1 g PO  x1  JERRELL in 2-3 weeks  Abstain  Partner needs treated

## 2021-03-15 NOTE — TELEPHONE ENCOUNTER
----- Message from EVIN Tadeo NP sent at 3/15/2021  8:13 AM EDT -----  + Chlamydia   Azithromycin 1 g PO  x1  JERRELL in 2-3 weeks  Abstain  Partner needs treated

## 2021-03-15 NOTE — TELEPHONE ENCOUNTER
Per Parnell Rinne pt notified of +Chlamydia and pt to get Zithromax 1gm po. Pt scheduled for JERRELL and pt instructed to abstain/partner need tx'd.

## 2021-03-18 ENCOUNTER — TELEPHONE (OUTPATIENT)
Dept: OBGYN CLINIC | Age: 26
End: 2021-03-18

## 2021-03-18 RX ORDER — METRONIDAZOLE 500 MG/1
500 TABLET ORAL 2 TIMES DAILY
Qty: 14 TABLET | Refills: 0 | Status: SHIPPED | OUTPATIENT
Start: 2021-03-18 | End: 2021-03-25

## 2021-03-18 NOTE — TELEPHONE ENCOUNTER
Pt states finished script, and improved as far as vag irritation, however she has had an increase in discharge. Pt is scheduled on 3/29 to reculture, should she come in sooner or try another script?

## 2021-03-18 NOTE — TELEPHONE ENCOUNTER
Returned phone call to patient. Per keegan James to send in flagyl.  Patient agreeable to come back into office to be re cultured on 3/29 or sooner if symptoms persist.

## 2021-03-29 ENCOUNTER — OFFICE VISIT (OUTPATIENT)
Dept: OBGYN CLINIC | Age: 26
End: 2021-03-29
Payer: MEDICARE

## 2021-03-29 ENCOUNTER — HOSPITAL ENCOUNTER (OUTPATIENT)
Age: 26
Setting detail: SPECIMEN
Discharge: HOME | End: 2021-03-29
Payer: MEDICARE

## 2021-03-29 VITALS
WEIGHT: 235 LBS | TEMPERATURE: 99 F | BODY MASS INDEX: 41.64 KG/M2 | SYSTOLIC BLOOD PRESSURE: 118 MMHG | HEIGHT: 63 IN | DIASTOLIC BLOOD PRESSURE: 70 MMHG

## 2021-03-29 DIAGNOSIS — N76.0 ACUTE VAGINITIS: Primary | ICD-10-CM

## 2021-03-29 LAB
DIRECT EXAM: NORMAL
Lab: NORMAL
SPECIMEN DESCRIPTION: NORMAL

## 2021-03-29 PROCEDURE — G8427 DOCREV CUR MEDS BY ELIG CLIN: HCPCS | Performed by: NURSE PRACTITIONER

## 2021-03-29 PROCEDURE — 87591 N.GONORRHOEAE DNA AMP PROB: CPT

## 2021-03-29 PROCEDURE — 99213 OFFICE O/P EST LOW 20 MIN: CPT | Performed by: NURSE PRACTITIONER

## 2021-03-29 PROCEDURE — 1036F TOBACCO NON-USER: CPT | Performed by: NURSE PRACTITIONER

## 2021-03-29 PROCEDURE — 87510 GARDNER VAG DNA DIR PROBE: CPT

## 2021-03-29 PROCEDURE — 87491 CHLMYD TRACH DNA AMP PROBE: CPT

## 2021-03-29 PROCEDURE — 87480 CANDIDA DNA DIR PROBE: CPT

## 2021-03-29 PROCEDURE — 87660 TRICHOMONAS VAGIN DIR PROBE: CPT

## 2021-03-29 PROCEDURE — G8484 FLU IMMUNIZE NO ADMIN: HCPCS | Performed by: NURSE PRACTITIONER

## 2021-03-29 PROCEDURE — G8417 CALC BMI ABV UP PARAM F/U: HCPCS | Performed by: NURSE PRACTITIONER

## 2021-03-29 NOTE — PROGRESS NOTES
Lu Lockett  3/29/2021    YOB: 1995          The patient was seen today. She is here regarding re culture for + chlamydia. Took treatment, partner took treatment. Patient has had intercourse last week with a condom. Her bowels are regular and she is voiding without difficulty. HPI:  Juan Freeman is a 30 y.o. female  reculture      OB History    Para Term  AB Living   2 2 2 0 0 2   SAB TAB Ectopic Molar Multiple Live Births   0 0 0 0 0 2      # Outcome Date GA Lbr Jf/2nd Weight Sex Delivery Anes PTL Lv   2 Term 14 38w2d  6 lb 15.5 oz (3.161 kg) M CS-LTranv Spinal N ASHLEY      Birth Comments: (+) GBS, low platelet count, excessive fetal growth      Apgar1: 9  Apgar5: 9   1 Term 12 39w3d  7 lb 7 oz (3.374 kg) F CS-LTranv   ASHLEY      Birth Comments: System Generated. Please review and update pregnancy details.       Name: Sammie Suresh       Past Medical History:   Diagnosis Date    Headache     History of chlamydia     Hyperglycemia     Obesity        Past Surgical History:   Procedure Laterality Date     SECTION      x 2        Family History   Problem Relation Age of Onset    Depression Mother     Anxiety Disorder Mother     Diabetes Paternal Grandfather     Diabetes Paternal Grandmother     Diabetes Maternal Grandmother     COPD Maternal Grandmother     Hypertension Maternal Grandmother     Diabetes Maternal Grandfather     Coronary Art Dis Maternal Grandfather     Breast Cancer Neg Hx     Cancer Neg Hx     Colon Cancer Neg Hx     Eclampsia Neg Hx     Ovarian Cancer Neg Hx      Labor Neg Hx     Spont Abortions Neg Hx     Stroke Neg Hx        Social History     Socioeconomic History    Marital status: Single     Spouse name: Not on file    Number of children: Not on file    Years of education: Not on file    Highest education level: Not on file   Occupational History    Not on file   Social Needs    Financial resource strain: Not on file    Food insecurity     Worry: Not on file     Inability: Not on file    Transportation needs     Medical: Not on file     Non-medical: Not on file   Tobacco Use    Smoking status: Never Smoker    Smokeless tobacco: Never Used   Substance and Sexual Activity    Alcohol use: Yes     Comment: social    Drug use: Yes     Frequency: 4.0 times per week     Types: Marijuana    Sexual activity: Yes     Partners: Male     Birth control/protection: Pill   Lifestyle    Physical activity     Days per week: Not on file     Minutes per session: Not on file    Stress: Not on file   Relationships    Social connections     Talks on phone: Not on file     Gets together: Not on file     Attends Congregational service: Not on file     Active member of club or organization: Not on file     Attends meetings of clubs or organizations: Not on file     Relationship status: Not on file    Intimate partner violence     Fear of current or ex partner: Not on file     Emotionally abused: Not on file     Physically abused: Not on file     Forced sexual activity: Not on file   Other Topics Concern    Not on file   Social History Narrative    Not on file         MEDICATIONS:  Current Outpatient Medications   Medication Sig Dispense Refill    escitalopram (LEXAPRO) 10 MG tablet Take 0.5 tablets by mouth daily 30 tablet 2    magnesium oxide (MAG-OX) 400 MG tablet take 1 tablet by mouth once daily 30 tablet 2    vitamin B-2 (RIBOFLAVIN) 100 MG TABS tablet take 1 tablet by mouth once daily 30 tablet 2    norethindrone-ethinyl estradiol (DOYLE 1/20) 1-20 MG-MCG per tablet take 1 tablet by mouth once daily 28 tablet 12    Omega-3 Fatty Acids (FISH OIL ADULT GUMMIES PO) Take by mouth      Probiotic Product (PROBIOTIC DAILY) CAPS Take 1 capsule by mouth daily. 30 capsule 2    Multiple Vitamins-Calcium (ONE-A-DAY WOMENS PO) Take by mouth       No current facility-administered medications for this visit. ALLERGIES:  Allergies as of 03/29/2021    (No Known Allergies)         REVIEW OF SYSTEMS:    yes   A minimum of an eleven point review of systems was completed. Review Of Systems (11 point):  Constitutional: No fever, chills or malaise; No weight change or fatigue  Head and Eyes: No vision, Headache, Dizziness or trauma in last 12 months  ENT ROS: No hearing, Tinnitis, sinus or taste problems  Hematological and Lymphatic ROS:No Lymphoma, Von Willebrand's, Hemophillia or Bleeding History  Psych ROS: No Depression, Homicidal thoughts,suicidal thoughts, or anxiety  Breast ROS: No prior breast abnormalities or lumps  Respiratory ROS: No SOB, Pneumoniae,Cough, or Pulmonary Embolism History  Cardiovascular ROS: No Chest Pain with Exertion, Palpitations, Syncope, Edema, Arrhythmia  Gastrointestinal ROS: No Indigestion, Heartburn, Nausea, vomiting, Diarrhea, Constipation,or Bowel Changes; No Bloody Stools or melena  Genito-Urinary ROS: No Dysuria, Hematuria or Nocturia. No Urinary Incontinence or Vaginal Discharge  Musculoskeletal ROS: No Arthralgia, Arthritis,Gout,Osteoporosis or Rheumatism  Neurological ROS: No CVA, Migraines, Epilepsy, Seizure Hx, or Limb Weakness  Dermatological ROS: No Rash, Itching, Hives, Mole Changes or Cancer          Blood pressure 118/70, temperature 99 °F (37.2 °C), height 5' 3\" (1.6 m), weight 235 lb (106.6 kg), last menstrual period 03/02/2021, not currently breastfeeding. Chaperone for Intimate Exam   Chaperone was offered and accepted as part of the rooming process.  Chaperone: Karmen MA         Abdomen: Soft non-tender; good bowel sounds. No guarding, rebound or rigidity. No CVA tenderness bilaterally. Extremities: No calf tenderness, DTR 2/4, and No edema bilaterally    Pelvic: Vulva and vagina appear normal. Bimanual exam reveals normal uterus and adnexa. Diagnostics:  No results found.     Lab Results:  Results for orders placed or performed during the hospital encounter of 03/10/21   VAGINITIS DNA PROBE    Specimen: Vaginal   Result Value Ref Range    Specimen Description . VAGINA     Special Requests NOT REPORTED     Direct Exam NEGATIVE for Candida sp. Direct Exam NEGATIVE for Trichomonas vaginalis     Direct Exam NEGATIVE for Gardnerella vaginalis     Direct Exam       Method of testing is a DNA probe intended for detection and identification of Candida species, Gardnerella vaginalis, and Trichomonas vaginalis nucleic acid in vaginal fluid specimens from patients with symptoms of vaginitis/vaginosis. C.trachomatis N.gonorrhoeae DNA    Specimen: Cervix   Result Value Ref Range    Specimen Description . CERVIX     C. trachomatis DNA (A) NEGATIVE     POSITIVE: CHLAMYDIA TRACHOMATIS DNA detected by nucleic acid amplification. N. gonorrhoeae DNA NEGATIVE NEGATIVE         Assessment:   Diagnosis Orders   1. Acute vaginitis       Patient Active Problem List    Diagnosis Date Noted    H/O:  2013     Priority: High     Repeat C/S  Low transverse      Type O blood, Rh negative 2012     Priority: High    Obesity, Class II, BMI 35-39.9 2018    Family history of diabetes mellitus 2018    Headache     Hyperglycemia     Low Platelet Count 3/9/07 2013     Platelets 924 on 8192 repeat in 1 month  PFA wnl             PLAN:  No follow-ups on file. Vaginal cultures collected  Repeat Annual every 1 year  Cervical Cytology Evaluation begins at 22years old. If Negative Cytology, Follow-up screening per current guidelines. Return to the office in prn weeks. Counseled on preventative health maintenance follow-up. No orders of the defined types were placed in this encounter. The patient, Catarina Jarquin is a 30 y.o. female, was seen with a total time spent of 20 minutes for the visit on this date of service by the E/M provider.  The time component had both face to face and non face to face time spent in determining the total time component. Counseling and education regarding her diagnosis listed below and her options regarding those diagnoses were also included in determining her time component. Diagnosis Orders   1. Acute vaginitis          The patient had her preventative health maintenance recommendations and follow-up reviewed with her at the completion of her visit.

## 2021-03-30 DIAGNOSIS — A74.9 CHLAMYDIA: Primary | ICD-10-CM

## 2021-03-30 RX ORDER — AZITHROMYCIN 500 MG/1
1000 TABLET, FILM COATED ORAL ONCE
Qty: 2 TABLET | Refills: 0 | Status: SHIPPED | OUTPATIENT
Start: 2021-03-30 | End: 2021-03-30

## 2021-03-30 NOTE — TELEPHONE ENCOUNTER
Per CNP, patient advised that JERRELL cultures indicate that chlamydia is still detected following initial diagnosis on 3/10/21. Patient advised that she should abstain from intercourse, that partner needs to seek treatment, and that she should be re tested in 2-3 weeks. Zithromax sent to pharmacy. Patient voiced an understanding of results and plan of care.

## 2021-03-30 NOTE — TELEPHONE ENCOUNTER
----- Message from EVIN Cowan NP sent at 3/30/2021 12:45 PM EDT -----  + Chlamydia   Azithromycin 1 g PO  x1  JERRELL in 2-3 weeks  Abstain  Partner needs treated

## 2021-04-15 ENCOUNTER — OFFICE VISIT (OUTPATIENT)
Dept: OBGYN CLINIC | Age: 26
End: 2021-04-15
Payer: MEDICARE

## 2021-04-15 ENCOUNTER — HOSPITAL ENCOUNTER (OUTPATIENT)
Age: 26
Setting detail: SPECIMEN
Discharge: HOME | End: 2021-04-15
Payer: MEDICARE

## 2021-04-15 VITALS
WEIGHT: 236 LBS | DIASTOLIC BLOOD PRESSURE: 80 MMHG | BODY MASS INDEX: 41.82 KG/M2 | HEIGHT: 63 IN | SYSTOLIC BLOOD PRESSURE: 122 MMHG | TEMPERATURE: 97.3 F

## 2021-04-15 DIAGNOSIS — N76.0 ACUTE VAGINITIS: Primary | ICD-10-CM

## 2021-04-15 DIAGNOSIS — A74.9 CHLAMYDIA: ICD-10-CM

## 2021-04-15 DIAGNOSIS — N76.0 ACUTE VAGINITIS: ICD-10-CM

## 2021-04-15 LAB
DIRECT EXAM: NORMAL
Lab: NORMAL
SPECIMEN DESCRIPTION: NORMAL

## 2021-04-15 PROCEDURE — 1036F TOBACCO NON-USER: CPT | Performed by: NURSE PRACTITIONER

## 2021-04-15 PROCEDURE — 87491 CHLMYD TRACH DNA AMP PROBE: CPT

## 2021-04-15 PROCEDURE — 87591 N.GONORRHOEAE DNA AMP PROB: CPT

## 2021-04-15 PROCEDURE — 99213 OFFICE O/P EST LOW 20 MIN: CPT | Performed by: NURSE PRACTITIONER

## 2021-04-15 PROCEDURE — G8427 DOCREV CUR MEDS BY ELIG CLIN: HCPCS | Performed by: NURSE PRACTITIONER

## 2021-04-15 PROCEDURE — 87660 TRICHOMONAS VAGIN DIR PROBE: CPT

## 2021-04-15 PROCEDURE — G8417 CALC BMI ABV UP PARAM F/U: HCPCS | Performed by: NURSE PRACTITIONER

## 2021-04-15 PROCEDURE — 87510 GARDNER VAG DNA DIR PROBE: CPT

## 2021-04-15 PROCEDURE — 87480 CANDIDA DNA DIR PROBE: CPT

## 2021-04-15 NOTE — PROGRESS NOTES
Lu Isaac Harlem  4/15/2021    YOB: 1995          The patient was seen today. She is here regarding JERRELL for chlamydia. States she took tx. Unsure if partner took treatment. No longer together. Denies s/sxs. Her bowels are regular and she is voiding without difficulty. HPI:  Merlyn Walter is a 30 y.o. female  JERRELL for chlamydia       OB History    Para Term  AB Living   2 2 2 0 0 2   SAB TAB Ectopic Molar Multiple Live Births   0 0 0 0 0 2      # Outcome Date GA Lbr Jf/2nd Weight Sex Delivery Anes PTL Lv   2 Term 14 38w2d  6 lb 15.5 oz (3.161 kg) M CS-LTranv Spinal N ASHLEY      Birth Comments: (+) GBS, low platelet count, excessive fetal growth      Apgar1: 9  Apgar5: 9   1 Term 12 39w3d  7 lb 7 oz (3.374 kg) F CS-LTranv   ASHLEY      Birth Comments: System Generated. Please review and update pregnancy details.       Name: Mena Habermann       Past Medical History:   Diagnosis Date    Headache     History of chlamydia     Hyperglycemia     Obesity        Past Surgical History:   Procedure Laterality Date     SECTION      x 2        Family History   Problem Relation Age of Onset    Depression Mother     Anxiety Disorder Mother     Diabetes Paternal Grandfather     Diabetes Paternal Grandmother     Diabetes Maternal Grandmother     COPD Maternal Grandmother     Hypertension Maternal Grandmother     Diabetes Maternal Grandfather     Coronary Art Dis Maternal Grandfather     Breast Cancer Neg Hx     Cancer Neg Hx     Colon Cancer Neg Hx     Eclampsia Neg Hx     Ovarian Cancer Neg Hx      Labor Neg Hx     Spont Abortions Neg Hx     Stroke Neg Hx        Social History     Socioeconomic History    Marital status: Single     Spouse name: Not on file    Number of children: Not on file    Years of education: Not on file    Highest education level: Not on file   Occupational History    Not on file   Social Needs    Financial resource strain: Not on file    Food insecurity     Worry: Not on file     Inability: Not on file    Transportation needs     Medical: Not on file     Non-medical: Not on file   Tobacco Use    Smoking status: Never Smoker    Smokeless tobacco: Never Used   Substance and Sexual Activity    Alcohol use: Yes     Comment: social    Drug use: Yes     Frequency: 4.0 times per week     Types: Marijuana    Sexual activity: Yes     Partners: Male     Birth control/protection: Pill   Lifestyle    Physical activity     Days per week: Not on file     Minutes per session: Not on file    Stress: Not on file   Relationships    Social connections     Talks on phone: Not on file     Gets together: Not on file     Attends Synagogue service: Not on file     Active member of club or organization: Not on file     Attends meetings of clubs or organizations: Not on file     Relationship status: Not on file    Intimate partner violence     Fear of current or ex partner: Not on file     Emotionally abused: Not on file     Physically abused: Not on file     Forced sexual activity: Not on file   Other Topics Concern    Not on file   Social History Narrative    Not on file         MEDICATIONS:  Current Outpatient Medications   Medication Sig Dispense Refill    escitalopram (LEXAPRO) 10 MG tablet Take 0.5 tablets by mouth daily 30 tablet 2    magnesium oxide (MAG-OX) 400 MG tablet take 1 tablet by mouth once daily 30 tablet 2    vitamin B-2 (RIBOFLAVIN) 100 MG TABS tablet take 1 tablet by mouth once daily 30 tablet 2    norethindrone-ethinyl estradiol (DOYLE 1/20) 1-20 MG-MCG per tablet take 1 tablet by mouth once daily 28 tablet 12    Omega-3 Fatty Acids (FISH OIL ADULT GUMMIES PO) Take by mouth      Probiotic Product (PROBIOTIC DAILY) CAPS Take 1 capsule by mouth daily. 30 capsule 2    Multiple Vitamins-Calcium (ONE-A-DAY WOMENS PO) Take by mouth       No current facility-administered medications for this visit. ALLERGIES:  Allergies as of 04/15/2021    (No Known Allergies)         REVIEW OF SYSTEMS:    yes   A minimum of an eleven point review of systems was completed. Review Of Systems (11 point):  Constitutional: No fever, chills or malaise; No weight change or fatigue  Head and Eyes: No vision, Headache, Dizziness or trauma in last 12 months  ENT ROS: No hearing, Tinnitis, sinus or taste problems  Hematological and Lymphatic ROS:No Lymphoma, Von Willebrand's, Hemophillia or Bleeding History  Psych ROS: No Depression, Homicidal thoughts,suicidal thoughts, or anxiety  Breast ROS: No prior breast abnormalities or lumps  Respiratory ROS: No SOB, Pneumoniae,Cough, or Pulmonary Embolism History  Cardiovascular ROS: No Chest Pain with Exertion, Palpitations, Syncope, Edema, Arrhythmia  Gastrointestinal ROS: No Indigestion, Heartburn, Nausea, vomiting, Diarrhea, Constipation,or Bowel Changes; No Bloody Stools or melena  Genito-Urinary ROS: No Dysuria, Hematuria or Nocturia. No Urinary Incontinence or Vaginal Discharge  Musculoskeletal ROS: No Arthralgia, Arthritis,Gout,Osteoporosis or Rheumatism  Neurological ROS: No CVA, Migraines, Epilepsy, Seizure Hx, or Limb Weakness  Dermatological ROS: No Rash, Itching, Hives, Mole Changes or Cancer          Blood pressure 122/80, temperature 97.3 °F (36.3 °C), height 5' 3\" (1.6 m), weight 236 lb (107 kg), last menstrual period 04/02/2021, not currently breastfeeding. Chaperone for Intimate Exam   Chaperone was offered and accepted as part of the rooming process.  Chaperone: selina MA         Abdomen: Soft non-tender; good bowel sounds. No guarding, rebound or rigidity. No CVA tenderness bilaterally. Extremities: No calf tenderness, DTR 2/4, and No edema bilaterally    Pelvic: Vulva and vagina appear normal. Bimanual exam reveals normal uterus and adnexa. Diagnostics:  No results found.     Lab Results:  Results for orders placed or performed during the hospital encounter of

## 2021-04-29 ENCOUNTER — TELEPHONE (OUTPATIENT)
Dept: PRIMARY CARE CLINIC | Age: 26
End: 2021-04-29

## 2021-04-29 NOTE — TELEPHONE ENCOUNTER
PT states Connie Luke is her PCP, it was not noted on the account but I did see that she has seen him in the past.    She would like to know if he prescribes \"adipex\" and if she needs to be seen to get it prescribed. Please contact patient back to discuss. Thank you.

## 2021-05-04 ENCOUNTER — OFFICE VISIT (OUTPATIENT)
Dept: PRIMARY CARE CLINIC | Age: 26
End: 2021-05-04
Payer: MEDICARE

## 2021-05-04 ENCOUNTER — HOSPITAL ENCOUNTER (OUTPATIENT)
Age: 26
Setting detail: SPECIMEN
Discharge: HOME | End: 2021-05-04
Payer: MEDICARE

## 2021-05-04 VITALS
SYSTOLIC BLOOD PRESSURE: 132 MMHG | HEIGHT: 63 IN | BODY MASS INDEX: 42.1 KG/M2 | DIASTOLIC BLOOD PRESSURE: 86 MMHG | WEIGHT: 237.6 LBS | OXYGEN SATURATION: 98 % | HEART RATE: 88 BPM

## 2021-05-04 DIAGNOSIS — Z79.899 HIGH RISK MEDICATION USE: Primary | ICD-10-CM

## 2021-05-04 DIAGNOSIS — E66.01 CLASS 3 SEVERE OBESITY WITH BODY MASS INDEX (BMI) OF 40.0 TO 44.9 IN ADULT, UNSPECIFIED OBESITY TYPE, UNSPECIFIED WHETHER SERIOUS COMORBIDITY PRESENT (HCC): ICD-10-CM

## 2021-05-04 DIAGNOSIS — Z79.899 HIGH RISK MEDICATION USE: ICD-10-CM

## 2021-05-04 LAB
ALCOHOL URINE: NORMAL
AMPHETAMINE SCREEN, URINE: NEGATIVE
BARBITURATE SCREEN, URINE: NEGATIVE
BENZODIAZEPINE SCREEN, URINE: NEGATIVE
BUPRENORPHINE URINE: NEGATIVE
COCAINE METABOLITE SCREEN URINE: NEGATIVE
FENTANYL SCREEN, URINE: NORMAL
GABAPENTIN SCREEN, URINE: NORMAL
MDMA URINE: NEGATIVE
METHADONE SCREEN, URINE: NEGATIVE
METHAMPHETAMINE, URINE: NEGATIVE
OPIATE SCREEN URINE: NEGATIVE
OXYCODONE SCREEN URINE: NEGATIVE
PHENCYCLIDINE SCREEN URINE: NORMAL
PROPOXYPHENE SCREEN, URINE: NORMAL
SYNTHETIC CANNABINOIDS(K2) SCREEN, URINE: NORMAL
THC SCREEN, URINE: NEGATIVE
TRAMADOL SCREEN URINE: NORMAL
TRICYCLIC ANTIDEPRESSANTS, UR: NORMAL
TSH SERPL DL<=0.05 MIU/L-ACNC: 1.56 MIU/L (ref 0.3–5)

## 2021-05-04 PROCEDURE — G8427 DOCREV CUR MEDS BY ELIG CLIN: HCPCS | Performed by: PHYSICIAN ASSISTANT

## 2021-05-04 PROCEDURE — 80305 DRUG TEST PRSMV DIR OPT OBS: CPT | Performed by: PHYSICIAN ASSISTANT

## 2021-05-04 PROCEDURE — 99213 OFFICE O/P EST LOW 20 MIN: CPT | Performed by: PHYSICIAN ASSISTANT

## 2021-05-04 PROCEDURE — 1036F TOBACCO NON-USER: CPT | Performed by: PHYSICIAN ASSISTANT

## 2021-05-04 PROCEDURE — G8417 CALC BMI ABV UP PARAM F/U: HCPCS | Performed by: PHYSICIAN ASSISTANT

## 2021-05-04 RX ORDER — PHENTERMINE HYDROCHLORIDE 37.5 MG/1
37.5 TABLET ORAL
Qty: 30 TABLET | Refills: 0 | Status: SHIPPED | OUTPATIENT
Start: 2021-05-04 | End: 2021-06-08 | Stop reason: SDUPTHER

## 2021-05-04 ASSESSMENT — ENCOUNTER SYMPTOMS
ABDOMINAL PAIN: 0
PHOTOPHOBIA: 0
RHINORRHEA: 0
SORE THROAT: 0
CHEST TIGHTNESS: 0
ABDOMINAL DISTENTION: 0
COUGH: 0
DIARRHEA: 0
SINUS PRESSURE: 0
SHORTNESS OF BREATH: 0
VOMITING: 0
CONSTIPATION: 0
EYE DISCHARGE: 0

## 2021-05-04 NOTE — PROGRESS NOTES
[address] [facility] PRIMARY CARE  [address]  [facility] [address]  Dept: Chitra is a 30 y.o. female Established patient, who presents today for her medical conditions/complaints as noted below. Chief Complaint   Patient presents with    Other     Wants to be placed on adipex        HPI:     HPI: The patient was on adipex in the past. She did well on it. No bad side effects. She does think that the weight is due to her appetite and eating a lot. When she was on adipex.      Reviewed prior notes Previous PCP  Reviewed previous Labs    LDL Cholesterol (mg/dL)   Date Value   2020 86       (goal LDL is <100)   AST (U/L)   Date Value   2020 18     ALT (U/L)   Date Value   2020 18     BUN (mg/dL)   Date Value   2020 8     Hemoglobin A1C (%)   Date Value   2018 4.8     TSH (mIU/L)   Date Value   2020 1.00     BP Readings from Last 3 Encounters:   21 132/86   04/15/21 122/80   21 118/70          (goal 120/80)    Past Medical History:   Diagnosis Date    Headache     History of chlamydia     Hyperglycemia     Obesity       Past Surgical History:   Procedure Laterality Date     SECTION      x 2        Family History   Problem Relation Age of Onset    Depression Mother     Anxiety Disorder Mother     Diabetes Paternal Grandfather     Diabetes Paternal Grandmother     Diabetes Maternal Grandmother     COPD Maternal Grandmother     Hypertension Maternal Grandmother     Diabetes Maternal Grandfather     Coronary Art Dis Maternal Grandfather     Breast Cancer Neg Hx     Cancer Neg Hx     Colon Cancer Neg Hx     Eclampsia Neg Hx     Ovarian Cancer Neg Hx      Labor Neg Hx     Spont Abortions Neg Hx     Stroke Neg Hx        Social History     Tobacco Use    Smoking status: Never Smoker    Smokeless tobacco: Never Used   Substance Use Topics    Alcohol use: Yes     Comment: social      Current Outpatient Medications   Medication Sig Dispense Refill    phentermine (ADIPEX-P) 37.5 MG tablet Take 1 tablet by mouth every morning (before breakfast) for 30 days. 30 tablet 0    escitalopram (LEXAPRO) 10 MG tablet Take 0.5 tablets by mouth daily 30 tablet 2    magnesium oxide (MAG-OX) 400 MG tablet take 1 tablet by mouth once daily 30 tablet 2    vitamin B-2 (RIBOFLAVIN) 100 MG TABS tablet take 1 tablet by mouth once daily 30 tablet 2    norethindrone-ethinyl estradiol (DOYLE 1/20) 1-20 MG-MCG per tablet take 1 tablet by mouth once daily 28 tablet 12    Omega-3 Fatty Acids (FISH OIL ADULT GUMMIES PO) Take by mouth      Probiotic Product (PROBIOTIC DAILY) CAPS Take 1 capsule by mouth daily. 30 capsule 2    Multiple Vitamins-Calcium (ONE-A-DAY WOMENS PO) Take by mouth       No current facility-administered medications for this visit. No Known Allergies    Health Maintenance   Topic Date Due    Hepatitis C screen  Never done    COVID-19 Vaccine (1) Never done    Cervical cancer screen  06/17/2020    Flu vaccine (Season Ended) 09/01/2021    DTaP/Tdap/Td vaccine (7 - Td) 02/09/2028    Hepatitis B vaccine  Completed    Varicella vaccine  Completed    HIV screen  Completed    Hepatitis A vaccine  Aged Out    Hib vaccine  Aged Out    Meningococcal (ACWY) vaccine  Aged Out    Pneumococcal 0-64 years Vaccine  Aged Out       Subjective:      Review of Systems   Constitutional: Positive for unexpected weight change (increase ). Negative for chills and fever. HENT: Negative for congestion, hearing loss, rhinorrhea, sinus pressure and sore throat. Eyes: Negative for photophobia, discharge and visual disturbance. Respiratory: Negative for cough, chest tightness and shortness of breath. Cardiovascular: Negative for chest pain, palpitations and leg swelling. Gastrointestinal: Negative for abdominal distention, abdominal pain, constipation, diarrhea and vomiting.

## 2021-06-08 ENCOUNTER — OFFICE VISIT (OUTPATIENT)
Dept: PRIMARY CARE CLINIC | Age: 26
End: 2021-06-08
Payer: MEDICARE

## 2021-06-08 VITALS
HEART RATE: 96 BPM | DIASTOLIC BLOOD PRESSURE: 76 MMHG | OXYGEN SATURATION: 98 % | HEIGHT: 63 IN | SYSTOLIC BLOOD PRESSURE: 122 MMHG | WEIGHT: 232 LBS | BODY MASS INDEX: 41.11 KG/M2

## 2021-06-08 DIAGNOSIS — E66.01 CLASS 3 SEVERE OBESITY WITH BODY MASS INDEX (BMI) OF 40.0 TO 44.9 IN ADULT, UNSPECIFIED OBESITY TYPE, UNSPECIFIED WHETHER SERIOUS COMORBIDITY PRESENT (HCC): ICD-10-CM

## 2021-06-08 DIAGNOSIS — Z79.899 HIGH RISK MEDICATION USE: ICD-10-CM

## 2021-06-08 LAB
ALCOHOL URINE: NORMAL
AMPHETAMINE SCREEN, URINE: NEGATIVE
BARBITURATE SCREEN, URINE: NEGATIVE
BENZODIAZEPINE SCREEN, URINE: NEGATIVE
BUPRENORPHINE URINE: NEGATIVE
COCAINE METABOLITE SCREEN URINE: NEGATIVE
FENTANYL SCREEN, URINE: NORMAL
GABAPENTIN SCREEN, URINE: NORMAL
MDMA URINE: NEGATIVE
METHADONE SCREEN, URINE: NEGATIVE
METHAMPHETAMINE, URINE: NEGATIVE
OPIATE SCREEN URINE: NEGATIVE
OXYCODONE SCREEN URINE: NEGATIVE
PHENCYCLIDINE SCREEN URINE: NORMAL
PROPOXYPHENE SCREEN, URINE: NORMAL
SYNTHETIC CANNABINOIDS(K2) SCREEN, URINE: NORMAL
THC SCREEN, URINE: NEGATIVE
TRAMADOL SCREEN URINE: NEGATIVE
TRICYCLIC ANTIDEPRESSANTS, UR: NEGATIVE

## 2021-06-08 PROCEDURE — G8427 DOCREV CUR MEDS BY ELIG CLIN: HCPCS | Performed by: PHYSICIAN ASSISTANT

## 2021-06-08 PROCEDURE — 1036F TOBACCO NON-USER: CPT | Performed by: PHYSICIAN ASSISTANT

## 2021-06-08 PROCEDURE — 80305 DRUG TEST PRSMV DIR OPT OBS: CPT | Performed by: PHYSICIAN ASSISTANT

## 2021-06-08 PROCEDURE — 99214 OFFICE O/P EST MOD 30 MIN: CPT | Performed by: PHYSICIAN ASSISTANT

## 2021-06-08 PROCEDURE — G8417 CALC BMI ABV UP PARAM F/U: HCPCS | Performed by: PHYSICIAN ASSISTANT

## 2021-06-08 RX ORDER — PHENTERMINE HYDROCHLORIDE 37.5 MG/1
37.5 TABLET ORAL
Qty: 30 TABLET | Refills: 0 | Status: SHIPPED | OUTPATIENT
Start: 2021-06-08 | End: 2021-07-12 | Stop reason: SDUPTHER

## 2021-06-08 SDOH — ECONOMIC STABILITY: FOOD INSECURITY: WITHIN THE PAST 12 MONTHS, THE FOOD YOU BOUGHT JUST DIDN'T LAST AND YOU DIDN'T HAVE MONEY TO GET MORE.: NEVER TRUE

## 2021-06-08 SDOH — ECONOMIC STABILITY: FOOD INSECURITY: WITHIN THE PAST 12 MONTHS, YOU WORRIED THAT YOUR FOOD WOULD RUN OUT BEFORE YOU GOT MONEY TO BUY MORE.: NEVER TRUE

## 2021-06-08 ASSESSMENT — ENCOUNTER SYMPTOMS
COUGH: 0
ABDOMINAL PAIN: 0
RHINORRHEA: 0
SHORTNESS OF BREATH: 0
DIARRHEA: 0
SINUS PRESSURE: 0
ABDOMINAL DISTENTION: 0
CONSTIPATION: 0
CHEST TIGHTNESS: 0
SORE THROAT: 0
EYE DISCHARGE: 0
VOMITING: 0
PHOTOPHOBIA: 0

## 2021-06-08 ASSESSMENT — SOCIAL DETERMINANTS OF HEALTH (SDOH): HOW HARD IS IT FOR YOU TO PAY FOR THE VERY BASICS LIKE FOOD, HOUSING, MEDICAL CARE, AND HEATING?: NOT HARD AT ALL

## 2021-06-08 NOTE — PROGRESS NOTES
[address] [facility] PRIMARY CARE  [address] [facility] [address]  Dept: Chitra is a 30 y.o. female Established patient, who presents today for her medical conditions/complaints as noted below. Chief Complaint   Patient presents with    Medication Check     1 month follow up        HPI:     HPI: The patient is here for a one month check of her adipex. She is down 5 lbs since her last visit. Has some dry mouth. Does have increased energy and motivation. She has lower appetite and eating healthier foods. Did have a vacation. Her exercise is good going to gym 3 days a week. No  Chest pain, sleeping fine, no palpitations.      Reviewed prior notes None  Reviewed previous Labs    LDL Cholesterol (mg/dL)   Date Value   2020 86       (goal LDL is <100)   AST (U/L)   Date Value   2020 18     ALT (U/L)   Date Value   2020 18     BUN (mg/dL)   Date Value   2020 8     Hemoglobin A1C (%)   Date Value   2018 4.8     TSH (mIU/L)   Date Value   2021 1.56     BP Readings from Last 3 Encounters:   21 122/76   21 132/86   04/15/21 122/80          (goal 120/80)    Past Medical History:   Diagnosis Date    Headache     History of chlamydia     Hyperglycemia     Obesity       Past Surgical History:   Procedure Laterality Date     SECTION      x 2        Family History   Problem Relation Age of Onset    Depression Mother     Anxiety Disorder Mother     Diabetes Paternal Grandfather     Diabetes Paternal Grandmother     Diabetes Maternal Grandmother     COPD Maternal Grandmother     Hypertension Maternal Grandmother     Diabetes Maternal Grandfather     Coronary Art Dis Maternal Grandfather     Breast Cancer Neg Hx     Cancer Neg Hx     Colon Cancer Neg Hx     Eclampsia Neg Hx     Ovarian Cancer Neg Hx      Labor Neg Hx     Spont Abortions Neg Hx     Stroke Neg Hx        Social History Tobacco Use    Smoking status: Never Smoker    Smokeless tobacco: Never Used   Substance Use Topics    Alcohol use: Yes     Comment: social      Current Outpatient Medications   Medication Sig Dispense Refill    phentermine (ADIPEX-P) 37.5 MG tablet Take 1 tablet by mouth every morning (before breakfast) for 30 days. 30 tablet 0    escitalopram (LEXAPRO) 10 MG tablet Take 0.5 tablets by mouth daily 30 tablet 2    magnesium oxide (MAG-OX) 400 MG tablet take 1 tablet by mouth once daily 30 tablet 2    vitamin B-2 (RIBOFLAVIN) 100 MG TABS tablet take 1 tablet by mouth once daily 30 tablet 2    norethindrone-ethinyl estradiol (DOYLE 1/20) 1-20 MG-MCG per tablet take 1 tablet by mouth once daily 28 tablet 12    Omega-3 Fatty Acids (FISH OIL ADULT GUMMIES PO) Take by mouth      Probiotic Product (PROBIOTIC DAILY) CAPS Take 1 capsule by mouth daily. 30 capsule 2    Multiple Vitamins-Calcium (ONE-A-DAY WOMENS PO) Take by mouth       No current facility-administered medications for this visit. No Known Allergies    Health Maintenance   Topic Date Due    Hepatitis C screen  Never done    COVID-19 Vaccine (1) Never done    Cervical cancer screen  06/17/2020    Flu vaccine (Season Ended) 09/01/2021    DTaP/Tdap/Td vaccine (7 - Td or Tdap) 02/09/2028    Hepatitis B vaccine  Completed    Varicella vaccine  Completed    HIV screen  Completed    Hepatitis A vaccine  Aged Out    Hib vaccine  Aged Out    Meningococcal (ACWY) vaccine  Aged Out    Pneumococcal 0-64 years Vaccine  Aged Out       Subjective:      Review of Systems   Constitutional: Negative for chills, fever and unexpected weight change. HENT: Negative for congestion, hearing loss, rhinorrhea, sinus pressure and sore throat. Eyes: Negative for photophobia, discharge and visual disturbance. Respiratory: Negative for cough, chest tightness and shortness of breath.     Cardiovascular: Negative for chest pain, palpitations and leg swelling. Gastrointestinal: Negative for abdominal distention, abdominal pain, constipation, diarrhea and vomiting. Endocrine: Negative for polydipsia and polyuria. Genitourinary: Negative for decreased urine volume, difficulty urinating, frequency and urgency. Musculoskeletal: Negative for arthralgias, gait problem and myalgias. Skin: Negative for rash. Allergic/Immunologic: Negative for food allergies. Neurological: Negative for dizziness, weakness, numbness and headaches. Hematological: Negative for adenopathy. Psychiatric/Behavioral: Negative for dysphoric mood and sleep disturbance. The patient is not nervous/anxious. Objective:     /76   Pulse 96   Ht 5' 3\" (1.6 m)   Wt 232 lb (105.2 kg)   SpO2 98%   BMI 41.10 kg/m²   Physical Exam  Constitutional:       General: She is not in acute distress. Appearance: Normal appearance. She is obese. She is not ill-appearing. HENT:      Head: Normocephalic and atraumatic. Right Ear: External ear normal.      Left Ear: External ear normal.      Nose: Nose normal.      Mouth/Throat:      Mouth: Mucous membranes are moist.   Eyes:      Extraocular Movements: Extraocular movements intact. Conjunctiva/sclera: Conjunctivae normal.      Pupils: Pupils are equal, round, and reactive to light. Neck:      Vascular: No carotid bruit. Cardiovascular:      Rate and Rhythm: Normal rate and regular rhythm. Pulses: Normal pulses. Heart sounds: Normal heart sounds. Pulmonary:      Effort: Pulmonary effort is normal. No respiratory distress. Breath sounds: Normal breath sounds. Abdominal:      General: Bowel sounds are normal. There is no distension. Tenderness: There is no abdominal tenderness. Musculoskeletal:         General: Normal range of motion. Cervical back: Normal range of motion and neck supple. Lymphadenopathy:      Cervical: No cervical adenopathy. Skin:     General: Skin is warm and dry. Neurological:      General: No focal deficit present. Mental Status: She is alert and oriented to person, place, and time. Psychiatric:         Mood and Affect: Mood normal.         Behavior: Behavior normal.         Thought Content: Thought content normal.         Assessment and Plan:          1. High risk medication use  -     phentermine (ADIPEX-P) 37.5 MG tablet; Take 1 tablet by mouth every morning (before breakfast) for 30 days. , Disp-30 tablet, R-0Normal  2. Class 3 severe obesity with body mass index (BMI) of 40.0 to 44.9 in adult, unspecified obesity type, unspecified whether serious comorbidity present (HCC)  -     phentermine (ADIPEX-P) 37.5 MG tablet; Take 1 tablet by mouth every morning (before breakfast) for 30 days. , Disp-30 tablet, R-0Normal             Patient given educational materials - see patient instructions. Discussed use, benefit, and side effects of prescribed medications. All patient questions answered. Pt voiced understanding. Reviewed health maintenance. Instructed to continue current medications, diet and exercise. Patient agreed with treatment plan. Follow up as directed.      Electronically signed by KACI Khan on 6/8/2021 at 9:24 AM

## 2021-06-29 DIAGNOSIS — Z79.899 HIGH RISK MEDICATION USE: ICD-10-CM

## 2021-06-29 DIAGNOSIS — E66.01 CLASS 3 SEVERE OBESITY WITH BODY MASS INDEX (BMI) OF 40.0 TO 44.9 IN ADULT, UNSPECIFIED OBESITY TYPE, UNSPECIFIED WHETHER SERIOUS COMORBIDITY PRESENT (HCC): ICD-10-CM

## 2021-07-12 ENCOUNTER — OFFICE VISIT (OUTPATIENT)
Dept: PRIMARY CARE CLINIC | Age: 26
End: 2021-07-12
Payer: MEDICARE

## 2021-07-12 VITALS
OXYGEN SATURATION: 98 % | WEIGHT: 226.2 LBS | HEART RATE: 93 BPM | SYSTOLIC BLOOD PRESSURE: 118 MMHG | HEIGHT: 63 IN | BODY MASS INDEX: 40.08 KG/M2 | DIASTOLIC BLOOD PRESSURE: 78 MMHG

## 2021-07-12 DIAGNOSIS — Z79.899 HIGH RISK MEDICATION USE: ICD-10-CM

## 2021-07-12 DIAGNOSIS — E66.01 CLASS 3 SEVERE OBESITY WITH BODY MASS INDEX (BMI) OF 40.0 TO 44.9 IN ADULT, UNSPECIFIED OBESITY TYPE, UNSPECIFIED WHETHER SERIOUS COMORBIDITY PRESENT (HCC): ICD-10-CM

## 2021-07-12 PROCEDURE — G8427 DOCREV CUR MEDS BY ELIG CLIN: HCPCS | Performed by: PHYSICIAN ASSISTANT

## 2021-07-12 PROCEDURE — 99213 OFFICE O/P EST LOW 20 MIN: CPT | Performed by: PHYSICIAN ASSISTANT

## 2021-07-12 PROCEDURE — 1036F TOBACCO NON-USER: CPT | Performed by: PHYSICIAN ASSISTANT

## 2021-07-12 PROCEDURE — G8417 CALC BMI ABV UP PARAM F/U: HCPCS | Performed by: PHYSICIAN ASSISTANT

## 2021-07-12 RX ORDER — PHENTERMINE HYDROCHLORIDE 37.5 MG/1
37.5 TABLET ORAL
Qty: 30 TABLET | Refills: 0 | Status: SHIPPED | OUTPATIENT
Start: 2021-07-12 | End: 2021-08-11

## 2021-07-12 ASSESSMENT — ENCOUNTER SYMPTOMS
CHEST TIGHTNESS: 0
SHORTNESS OF BREATH: 0
VOMITING: 0
NAUSEA: 0
CHOKING: 0

## 2021-07-12 NOTE — PROGRESS NOTES
[address] [facility] PRIMARY CARE  [address] [facility] [address]  Dept: Chitra is a 30 y.o. female Established patient, who presents today for her medical conditions/complaints as noted below. Chief Complaint   Patient presents with    Medication Check     1 Month Follow up Adipex       HPI:     HPI: The patient is here for a one month check of her adipex. She is down 5 lbs since her last visit. Has some dry mouth. Does have increased energy and motivation. She has lower appetite and eating healthier foods. Did have a vacation. Her exercise is good going to gym 3 days a week. No  Chest pain, sleeping fine, no palpitations.      Reviewed prior notes None  Reviewed previous Labs    LDL Cholesterol (mg/dL)   Date Value   2020 86       (goal LDL is <100)   AST (U/L)   Date Value   2020 18     ALT (U/L)   Date Value   2020 18     BUN (mg/dL)   Date Value   2020 8     Hemoglobin A1C (%)   Date Value   2018 4.8     TSH (mIU/L)   Date Value   2021 1.56     BP Readings from Last 3 Encounters:   21 118/78   21 122/76   21 132/86          (goal 120/80)    Past Medical History:   Diagnosis Date    Headache     History of chlamydia     Hyperglycemia     Obesity       Past Surgical History:   Procedure Laterality Date     SECTION      x 2        Family History   Problem Relation Age of Onset    Depression Mother     Anxiety Disorder Mother     Diabetes Paternal Grandfather     Diabetes Paternal Grandmother     Diabetes Maternal Grandmother     COPD Maternal Grandmother     Hypertension Maternal Grandmother     Diabetes Maternal Grandfather     Coronary Art Dis Maternal Grandfather     Breast Cancer Neg Hx     Cancer Neg Hx     Colon Cancer Neg Hx     Eclampsia Neg Hx     Ovarian Cancer Neg Hx      Labor Neg Hx     Spont Abortions Neg Hx     Stroke Neg Hx        Social History     Tobacco Use    Smoking status: Never Smoker    Smokeless tobacco: Never Used   Substance Use Topics    Alcohol use: Yes     Comment: social      Current Outpatient Medications   Medication Sig Dispense Refill    phentermine (ADIPEX-P) 37.5 MG tablet Take 1 tablet by mouth every morning (before breakfast) for 30 days. 30 tablet 0    escitalopram (LEXAPRO) 10 MG tablet Take 0.5 tablets by mouth daily 30 tablet 2    magnesium oxide (MAG-OX) 400 MG tablet take 1 tablet by mouth once daily 30 tablet 2    vitamin B-2 (RIBOFLAVIN) 100 MG TABS tablet take 1 tablet by mouth once daily 30 tablet 2    norethindrone-ethinyl estradiol (DOYLE 1/20) 1-20 MG-MCG per tablet take 1 tablet by mouth once daily 28 tablet 12    Omega-3 Fatty Acids (FISH OIL ADULT GUMMIES PO) Take by mouth      Probiotic Product (PROBIOTIC DAILY) CAPS Take 1 capsule by mouth daily. 30 capsule 2    Multiple Vitamins-Calcium (ONE-A-DAY WOMENS PO) Take by mouth       No current facility-administered medications for this visit. No Known Allergies    Health Maintenance   Topic Date Due    Hepatitis C screen  Never done    COVID-19 Vaccine (1) Never done    Cervical cancer screen  06/17/2020    Flu vaccine (1) 09/01/2021    DTaP/Tdap/Td vaccine (7 - Td or Tdap) 02/09/2028    Hepatitis B vaccine  Completed    Varicella vaccine  Completed    HIV screen  Completed    Hepatitis A vaccine  Aged Out    Hib vaccine  Aged Out    Meningococcal (ACWY) vaccine  Aged Out    Pneumococcal 0-64 years Vaccine  Aged Out       Subjective:      Review of Systems   Constitutional: Positive for activity change. Negative for fatigue. Respiratory: Negative for choking, chest tightness and shortness of breath. Cardiovascular: Negative for chest pain and palpitations. Gastrointestinal: Negative for nausea and vomiting. Skin: Negative for rash. Psychiatric/Behavioral: Negative for dysphoric mood, self-injury and sleep disturbance.  The patient is not nervous/anxious. Objective:     /78   Pulse 93   Ht 5' 3\" (1.6 m)   Wt 226 lb 3.2 oz (102.6 kg)   SpO2 98%   BMI 40.07 kg/m²   Physical Exam  Constitutional:       General: She is not in acute distress. Appearance: Normal appearance. She is not ill-appearing. HENT:      Head: Normocephalic and atraumatic. Right Ear: External ear normal.      Left Ear: External ear normal.      Nose: Nose normal.      Mouth/Throat:      Mouth: Mucous membranes are moist.   Eyes:      Extraocular Movements: Extraocular movements intact. Conjunctiva/sclera: Conjunctivae normal.      Pupils: Pupils are equal, round, and reactive to light. Neck:      Vascular: No carotid bruit. Cardiovascular:      Rate and Rhythm: Normal rate and regular rhythm. Pulses: Normal pulses. Heart sounds: Normal heart sounds. Pulmonary:      Effort: Pulmonary effort is normal. No respiratory distress. Breath sounds: Normal breath sounds. Abdominal:      General: Bowel sounds are normal. There is no distension. Tenderness: There is no abdominal tenderness. Musculoskeletal:         General: Normal range of motion. Cervical back: Normal range of motion and neck supple. Lymphadenopathy:      Cervical: No cervical adenopathy. Skin:     General: Skin is warm and dry. Neurological:      General: No focal deficit present. Mental Status: She is alert and oriented to person, place, and time. Psychiatric:         Mood and Affect: Mood normal.         Behavior: Behavior normal.         Thought Content: Thought content normal.         Assessment and Plan:          1. High risk medication use  -     phentermine (ADIPEX-P) 37.5 MG tablet; Take 1 tablet by mouth every morning (before breakfast) for 30 days. , Disp-30 tablet, R-0Normal  2.  Class 3 severe obesity with body mass index (BMI) of 40.0 to 44.9 in adult, unspecified obesity type, unspecified whether serious comorbidity present ([facility] 75.)  -     phentermine (ADIPEX-P) 37.5 MG tablet; Take 1 tablet by mouth every morning (before breakfast) for 30 days. , Disp-30 tablet, R-0Normal             Patient given educational materials - see patient instructions. Discussed use, benefit, and side effects of prescribed medications. All patient questions answered. Pt voiced understanding. Reviewed health maintenance. Instructed to continue current medications, diet and exercise. Patient agreed with treatment plan. Follow up as directed.      Electronically signed by KACI Forbes on 7/12/2021 at 9:28 AM

## 2021-08-18 ENCOUNTER — CLINICAL DOCUMENTATION (OUTPATIENT)
Dept: PRIMARY CARE CLINIC | Age: 26
End: 2021-08-18

## 2021-08-18 ENCOUNTER — OFFICE VISIT (OUTPATIENT)
Dept: PRIMARY CARE CLINIC | Age: 26
End: 2021-08-18
Payer: MEDICARE

## 2021-08-18 VITALS
HEART RATE: 100 BPM | HEIGHT: 63 IN | SYSTOLIC BLOOD PRESSURE: 132 MMHG | OXYGEN SATURATION: 99 % | DIASTOLIC BLOOD PRESSURE: 86 MMHG | WEIGHT: 224.8 LBS | BODY MASS INDEX: 39.83 KG/M2

## 2021-08-18 DIAGNOSIS — D69.1 ABNORMAL PLATELETS (HCC): ICD-10-CM

## 2021-08-18 DIAGNOSIS — E66.9 CLASS 2 OBESITY WITH BODY MASS INDEX (BMI) OF 39.0 TO 39.9 IN ADULT, UNSPECIFIED OBESITY TYPE, UNSPECIFIED WHETHER SERIOUS COMORBIDITY PRESENT: Primary | ICD-10-CM

## 2021-08-18 PROCEDURE — 1036F TOBACCO NON-USER: CPT | Performed by: PHYSICIAN ASSISTANT

## 2021-08-18 PROCEDURE — 99214 OFFICE O/P EST MOD 30 MIN: CPT | Performed by: PHYSICIAN ASSISTANT

## 2021-08-18 PROCEDURE — G8427 DOCREV CUR MEDS BY ELIG CLIN: HCPCS | Performed by: PHYSICIAN ASSISTANT

## 2021-08-18 PROCEDURE — G8417 CALC BMI ABV UP PARAM F/U: HCPCS | Performed by: PHYSICIAN ASSISTANT

## 2021-08-18 ASSESSMENT — ENCOUNTER SYMPTOMS
DIARRHEA: 0
SHORTNESS OF BREATH: 0
EYE DISCHARGE: 0
SORE THROAT: 0
VOMITING: 0
COUGH: 0
ABDOMINAL DISTENTION: 0
RHINORRHEA: 0
CONSTIPATION: 0
PHOTOPHOBIA: 0
SINUS PRESSURE: 0
ABDOMINAL PAIN: 0
CHEST TIGHTNESS: 0

## 2021-08-18 NOTE — PROGRESS NOTES
[address] [facility] PRIMARY CARE  [address]  [facility] [address]  Dept: Chitra is a 30 y.o. female Established patient, who presents today for her medical conditions/complaints as noted below. Chief Complaint   Patient presents with    Medication Check     1 month follow up - Adipex       HPI:     HPI: The patient is a pleasant 25-year-old female who presents today with concerns of weight loss. Patient has been on Adipex over the last 3 months and has lost under goal weight. The patient has lost 8 lbs over the last three month. Patient would still like further weight loss at this time. Patient's BMI still 39.82.     Reviewed prior notes None  Reviewed previous Labs    LDL Cholesterol (mg/dL)   Date Value   2020 86       (goal LDL is <100)   AST (U/L)   Date Value   2020 18     ALT (U/L)   Date Value   2020 18     BUN (mg/dL)   Date Value   2020 8     Hemoglobin A1C (%)   Date Value   2018 4.8     TSH (mIU/L)   Date Value   2021 1.56     BP Readings from Last 3 Encounters:   21 132/86   21 118/78   21 122/76          (goal 120/80)    Past Medical History:   Diagnosis Date    Headache     History of chlamydia     Hyperglycemia     Obesity       Past Surgical History:   Procedure Laterality Date     SECTION      x 2        Family History   Problem Relation Age of Onset    Depression Mother     Anxiety Disorder Mother     Diabetes Paternal Grandfather     Diabetes Paternal Grandmother     Diabetes Maternal Grandmother     COPD Maternal Grandmother     Hypertension Maternal Grandmother     Diabetes Maternal Grandfather     Coronary Art Dis Maternal Grandfather     Breast Cancer Neg Hx     Cancer Neg Hx     Colon Cancer Neg Hx     Eclampsia Neg Hx     Ovarian Cancer Neg Hx      Labor Neg Hx     Spont Abortions Neg Hx     Stroke Neg Hx        Social History Tobacco Use    Smoking status: Never Smoker    Smokeless tobacco: Never Used   Substance Use Topics    Alcohol use: Yes     Comment: social      Current Outpatient Medications   Medication Sig Dispense Refill    liraglutide-weight management 18 MG/3ML SOPN Inject 3 mg into the skin once a week Inject SC into abdomen, thigh or upper arm once daily. Week 1: 0.6mg daily Week 2: 1.2mg daily Week 3: 1.8 mg daily Week 4: 2.4 mg daily Week 5 and onward: 3.0mg 2 pen 1    escitalopram (LEXAPRO) 10 MG tablet Take 0.5 tablets by mouth daily 30 tablet 2    magnesium oxide (MAG-OX) 400 MG tablet take 1 tablet by mouth once daily 30 tablet 2    vitamin B-2 (RIBOFLAVIN) 100 MG TABS tablet take 1 tablet by mouth once daily 30 tablet 2    norethindrone-ethinyl estradiol (DOYLE 1/20) 1-20 MG-MCG per tablet take 1 tablet by mouth once daily 28 tablet 12    Omega-3 Fatty Acids (FISH OIL ADULT GUMMIES PO) Take by mouth      Probiotic Product (PROBIOTIC DAILY) CAPS Take 1 capsule by mouth daily. 30 capsule 2    Multiple Vitamins-Calcium (ONE-A-DAY WOMENS PO) Take by mouth       No current facility-administered medications for this visit. No Known Allergies    Health Maintenance   Topic Date Due    Hepatitis C screen  Never done    COVID-19 Vaccine (1) Never done    Cervical cancer screen  06/17/2020    Flu vaccine (1) 09/01/2021    DTaP/Tdap/Td vaccine (7 - Td or Tdap) 02/09/2028    Hepatitis B vaccine  Completed    Varicella vaccine  Completed    HIV screen  Completed    Hepatitis A vaccine  Aged Out    Hib vaccine  Aged Out    Meningococcal (ACWY) vaccine  Aged Out    Pneumococcal 0-64 years Vaccine  Aged Out       Subjective:      Review of Systems   Constitutional: Negative for chills, fever and unexpected weight change. HENT: Negative for congestion, hearing loss, rhinorrhea, sinus pressure and sore throat. Eyes: Negative for photophobia, discharge and visual disturbance.    Respiratory: Negative for cough, chest tightness and shortness of breath. Cardiovascular: Negative for chest pain, palpitations and leg swelling. Gastrointestinal: Negative for abdominal distention, abdominal pain, constipation, diarrhea and vomiting. Endocrine: Negative for polydipsia and polyuria. Genitourinary: Negative for decreased urine volume, difficulty urinating, frequency and urgency. Musculoskeletal: Negative for arthralgias, gait problem and myalgias. Skin: Negative for rash. Allergic/Immunologic: Negative for food allergies. Neurological: Negative for dizziness, weakness, numbness and headaches. Hematological: Negative for adenopathy. Psychiatric/Behavioral: Negative for dysphoric mood and sleep disturbance. The patient is not nervous/anxious. Objective:     /86   Pulse 100   Ht 5' 3\" (1.6 m)   Wt 224 lb 12.8 oz (102 kg)   SpO2 99%   BMI 39.82 kg/m²   Physical Exam  Constitutional:       General: She is not in acute distress. Appearance: Normal appearance. She is obese. She is not ill-appearing. HENT:      Head: Normocephalic and atraumatic. Right Ear: Tympanic membrane, ear canal and external ear normal.      Left Ear: Tympanic membrane, ear canal and external ear normal.      Nose: Nose normal.      Mouth/Throat:      Mouth: Mucous membranes are moist.   Eyes:      Extraocular Movements: Extraocular movements intact. Conjunctiva/sclera: Conjunctivae normal.      Pupils: Pupils are equal, round, and reactive to light. Neck:      Vascular: No carotid bruit. Cardiovascular:      Rate and Rhythm: Normal rate and regular rhythm. Pulses: Normal pulses. Heart sounds: Normal heart sounds. Pulmonary:      Effort: Pulmonary effort is normal. No respiratory distress. Breath sounds: Normal breath sounds. Abdominal:      General: Bowel sounds are normal. There is no distension. Tenderness: There is no abdominal tenderness.    Musculoskeletal: General: Normal range of motion. Cervical back: Normal range of motion and neck supple. Lymphadenopathy:      Cervical: No cervical adenopathy. Skin:     General: Skin is warm and dry. Neurological:      General: No focal deficit present. Mental Status: She is alert and oriented to person, place, and time. Psychiatric:         Mood and Affect: Mood normal.         Behavior: Behavior normal.         Thought Content: Thought content normal.         Assessment and Plan:          1. Class 2 obesity with body mass index (BMI) of 39.0 to 39.9 in adult, unspecified obesity type, unspecified whether serious comorbidity present  -     liraglutide-weight management 18 MG/3ML SOPN; Inject 3 mg into the skin once a week Inject SC into abdomen, thigh or upper arm once daily. Week 1: 0.6mg daily Week 2: 1.2mg daily Week 3: 1.8 mg daily Week 4: 2.4 mg daily Week 5 and onward: 3.0mg, Disp-2 pen, R-1Normal  -     Bianca Fuller CNP, [facility]  2. Abnormal platelets (HCC)  No abnormal platelets we will continue to monitor blood work. Patient was educated on different weight loss options including Saxenda and Qsymia. Since patient has not been responding well to Adipex chose not to go to Qsymia and [facility] Patient educated we will try to get this through insurance due to patient's need for increased weight loss. Patient would also like referral to weight loss clinic. Patient given educational materials - see patient instructions. Discussed use, benefit, and side effects of prescribed medications. All patient questions answered. Pt voiced understanding. Reviewed health maintenance. Instructed to continue current medications, diet and exercise. Patient agreed with treatment plan. Follow up as directed.      Electronically signed by KACI Melvin on 8/18/2021 at 11:09 AM

## 2021-08-30 DIAGNOSIS — L70.9 ACNE, UNSPECIFIED ACNE TYPE: Primary | ICD-10-CM

## 2021-08-30 RX ORDER — CLINDAMYCIN PHOSPHATE 10 MG/ML
SOLUTION TOPICAL
Qty: 90 EACH | Refills: 2 | Status: SHIPPED | OUTPATIENT
Start: 2021-08-30 | End: 2021-11-03

## 2021-08-31 ENCOUNTER — TELEPHONE (OUTPATIENT)
Dept: PRIMARY CARE CLINIC | Age: 26
End: 2021-08-31

## 2021-08-31 NOTE — TELEPHONE ENCOUNTER
----- Message from [address] [facility] sent at 8/31/2021  9:30 AM EDT -----  Subject: Message to Provider    QUESTIONS  Information for Provider? Zach olmos needs prior auth and so does   clindamycin phosphate. please call pt when approved or denied. ---------------------------------------------------------------------------  --------------  Cipriano Homme INFO  What is the best way for the office to contact you? OK to leave message on   voicemail  Preferred Call Back Phone Number? [phone]  ---------------------------------------------------------------------------  --------------  SCRIPT ANSWERS  Relationship to Patient?  Self

## 2021-09-10 NOTE — TELEPHONE ENCOUNTER
Advise patient both medications denied and advise her about options of weight loss clinic referral or we can do qsymia at the specialty pharmacy for 98 dollars.

## 2021-09-25 DIAGNOSIS — F41.9 ANXIETY AND DEPRESSION: ICD-10-CM

## 2021-09-25 DIAGNOSIS — F32.A ANXIETY AND DEPRESSION: ICD-10-CM

## 2021-09-27 RX ORDER — ESCITALOPRAM OXALATE 10 MG/1
TABLET ORAL
Qty: 30 TABLET | Refills: 2 | Status: SHIPPED | OUTPATIENT
Start: 2021-09-27 | End: 2022-03-22

## 2021-11-03 ENCOUNTER — HOSPITAL ENCOUNTER (OUTPATIENT)
Age: 26
Setting detail: SPECIMEN
Discharge: HOME | End: 2021-11-03
Payer: MEDICARE

## 2021-11-03 ENCOUNTER — OFFICE VISIT (OUTPATIENT)
Dept: OBGYN CLINIC | Age: 26
End: 2021-11-03
Payer: MEDICARE

## 2021-11-03 VITALS
DIASTOLIC BLOOD PRESSURE: 82 MMHG | WEIGHT: 236 LBS | SYSTOLIC BLOOD PRESSURE: 116 MMHG | HEIGHT: 63 IN | BODY MASS INDEX: 41.82 KG/M2

## 2021-11-03 DIAGNOSIS — Z30.41 FAMILY PLANNING, BCP (BIRTH CONTROL PILLS) MAINTENANCE: ICD-10-CM

## 2021-11-03 DIAGNOSIS — Z11.3 SCREEN FOR STD (SEXUALLY TRANSMITTED DISEASE): ICD-10-CM

## 2021-11-03 DIAGNOSIS — N90.89 VULVAL LESION: ICD-10-CM

## 2021-11-03 DIAGNOSIS — Z01.419 WOMEN'S ANNUAL ROUTINE GYNECOLOGICAL EXAMINATION: Primary | ICD-10-CM

## 2021-11-03 LAB
HEPATITIS B SURFACE ANTIGEN: NONREACTIVE
HEPATITIS C ANTIBODY: NONREACTIVE
HIV AG/AB: NONREACTIVE
T. PALLIDUM, IGG: NONREACTIVE

## 2021-11-03 PROCEDURE — 86696 HERPES SIMPLEX TYPE 2 TEST: CPT

## 2021-11-03 PROCEDURE — 86803 HEPATITIS C AB TEST: CPT

## 2021-11-03 PROCEDURE — 87389 HIV-1 AG W/HIV-1&-2 AB AG IA: CPT

## 2021-11-03 PROCEDURE — 86695 HERPES SIMPLEX TYPE 1 TEST: CPT

## 2021-11-03 PROCEDURE — G8484 FLU IMMUNIZE NO ADMIN: HCPCS | Performed by: NURSE PRACTITIONER

## 2021-11-03 PROCEDURE — 86694 HERPES SIMPLEX NES ANTBDY: CPT

## 2021-11-03 PROCEDURE — 87340 HEPATITIS B SURFACE AG IA: CPT

## 2021-11-03 PROCEDURE — 86780 TREPONEMA PALLIDUM: CPT

## 2021-11-03 PROCEDURE — 36415 COLL VENOUS BLD VENIPUNCTURE: CPT

## 2021-11-03 PROCEDURE — 99395 PREV VISIT EST AGE 18-39: CPT | Performed by: NURSE PRACTITIONER

## 2021-11-03 RX ORDER — NORETHINDRONE ACETATE AND ETHINYL ESTRADIOL 1; .02 MG/1; MG/1
TABLET ORAL
Qty: 28 TABLET | Refills: 12 | Status: SHIPPED | OUTPATIENT
Start: 2021-11-03 | End: 2022-10-06 | Stop reason: SDUPTHER

## 2021-11-03 NOTE — PROGRESS NOTES
History and Physical  [address], [address] [facility] [address] [phone]   Fax [phone]  Brianne Zazueta  11/3/2021              27 y.o. Chief Complaint   Patient presents with    Annual Exam       Patient's last menstrual period was 10/18/2021. Primary Care Physician: KACI Damon    The patient was seen and examined. She has  is here for her annual exam. C/o vaginal lesion x 1-2 weeks ago. State she noticed it after her menses and was unsure if her pad caused irritation. Her bowels are regular. There are no voiding complaints. She denies any bloating. She denies vaginal discharge and was counseled on STD's and the need for barrier contraception. HPI : Brianne Zazueta is a 27 y.o. female [id number]    Annual exam  Vulvar lesion   Desires STD screening  ________________________________________________________________________  OB History    Para Term  AB Living   2 2 2 0 0 2   SAB TAB Ectopic Molar Multiple Live Births   0 0 0 0 0 2      # Outcome Date GA Lbr Jf/2nd Weight Sex Delivery Anes PTL Lv   2 Term 14 38w2d  6 lb 15.5 oz (3.161 kg) M CS-LTranv Spinal N ASHLEY      Birth Comments: (+) GBS, low platelet count, excessive fetal growth      Apgar1: 9  Apgar5: 9   1 Term 12 39w3d  7 lb 7 oz (3.374 kg) F CS-LTranv   ASHLEY      Birth Comments: System Generated. Please review and update pregnancy details.       Name: Jodi Ken     Past Medical History:   Diagnosis Date    Headache     History of chlamydia     Hyperglycemia     Obesity                                                                    Past Surgical History:   Procedure Laterality Date     SECTION      x 2      Family History   Problem Relation Age of Onset    Depression Mother     Anxiety Disorder Mother     Diabetes Paternal Grandfather     Diabetes Paternal Grandmother     Diabetes Maternal Grandmother     COPD Maternal Grandmother     Hypertension Maternal Grandmother     Diabetes Maternal Grandfather     Coronary Art Dis Maternal Grandfather     Breast Cancer Neg Hx     Cancer Neg Hx     Colon Cancer Neg Hx     Eclampsia Neg Hx     Ovarian Cancer Neg Hx      Labor Neg Hx     Spont Abortions Neg Hx     Stroke Neg Hx      Social History     Socioeconomic History    Marital status: Single     Spouse name: Not on file    Number of children: Not on file    Years of education: Not on file    Highest education level: Not on file   Occupational History    Not on file   Tobacco Use    Smoking status: Never Smoker    Smokeless tobacco: Never Used   Vaping Use    Vaping Use: Never used   Substance and Sexual Activity    Alcohol use: Yes     Comment: social    Drug use: Yes     Frequency: 4.0 times per week     Types: Marijuana Leigh Lito)    Sexual activity: Yes     Partners: Male     Birth control/protection: Pill   Other Topics Concern    Not on file   Social History Narrative    Not on file     Social Determinants of Health     Financial Resource Strain: Low Risk     Difficulty of Paying Living Expenses: Not hard at all   Food Insecurity: No Food Insecurity    Worried About Running Out of Food in the Last Year: Never true    Robbi of Food in the Last Year: Never true   Transportation Needs:     Lack of Transportation (Medical):      Lack of Transportation (Non-Medical):    Physical Activity:     Days of Exercise per Week:     Minutes of Exercise per Session:    Stress:     Feeling of Stress :    Social Connections:     Frequency of Communication with Friends and Family:     Frequency of Social Gatherings with Friends and Family:     Attends [employer] Services:     Active Member of Clubs or Organizations:     Attends Club or Organization Meetings:     Marital Status:    Intimate Partner Violence:     Fear of Current or Ex-Partner:     Emotionally Abused:     Physically Abused:     Sexually Abused:        MEDICATIONS:  Current Outpatient Medications   Medication Sig Dispense Refill    norethindrone-ethinyl estradiol (DOYLE 1/20) 1-20 MG-MCG per tablet take 1 tablet by mouth once daily 28 tablet 12    escitalopram (LEXAPRO) 10 MG tablet take 1/2 tablet by mouth once daily 30 tablet 2    magnesium oxide (MAG-OX) 400 MG tablet take 1 tablet by mouth once daily 30 tablet 2    vitamin B-2 (RIBOFLAVIN) 100 MG TABS tablet take 1 tablet by mouth once daily 30 tablet 2    Omega-3 Fatty Acids (FISH OIL ADULT GUMMIES PO) Take by mouth      Probiotic Product (PROBIOTIC DAILY) CAPS Take 1 capsule by mouth daily. 30 capsule 2    Multiple Vitamins-Calcium (ONE-A-DAY WOMENS PO) Take by mouth       No current facility-administered medications for this visit. ALLERGIES:  Allergies as of 11/03/2021    (No Known Allergies)       Symptoms of decreased mood absent  Symptoms of anhedonia absent    **If either question is answered in a  positive fashion then complete the PHQ9 Scoring Evaluation and make the appropriate referral**      Immunization status: stated as current, but no records available. Gynecologic History:  Menarche: 13 yo  Menopause at [age] yo     Patient's last menstrual period was 10/18/2021. Sexually Active: Yes    STD History: Yes CT     Permanent Sterilization: No   Reversible Birth Control: Yes OCP        Hormone Replacement Exposure: No      Genetic Qualified Family History of Breast, Ovarian , Colon or Uterine Cancer: Yes M Aunt breast cancer unsure of age     If YES see scanned worksheet.     Preventative Health Testing:    Health Maintenance:  Health Maintenance Due   Topic Date Due    Hepatitis C screen  Never done    COVID-19 Vaccine (1) Never done    Flu vaccine (1) 09/01/2021       Date of Last Pap Smear: 6/17/2019 neg  Abnormal Pap Smear History: denies  Colposcopy History:   Date of Last Mammogram: NA  Date of Last Colonoscopy:   Date of Last Bone Density:      ________________________________________________________________________        REVIEW OF SYSTEMS:    yes   A minimum of an eleven point review of systems was completed. Review Of Systems (11 point):  Constitutional: No fever, chills or malaise; No weight change or fatigue  Head and Eyes: No vision changes, Headache, Dizziness or trauma in last 12 months  ENT ROS: No hearing, Tinnitis, sinus or taste problems  Hematological and Lymphatic ROS:No Lymphoma, Von Willebrand's, Hemophillia or Bleeding History  Psych ROS: No Depression, Homicidal thoughts,suicidal thoughts, or anxiety  Breast ROS: No breast abnormalities or lumps  Respiratory ROS: No SOB, Pneumoniae,Cough, or Pulmonary Embolism   Cardiovascular ROS: No Chest Pain with Exertion, Palpitations, Syncope, Edema, Arrhythmia  Gastrointestinal ROS: No Indigestion, Heartburn, Nausea, vomiting, Diarrhea, Constipation,or Bowel Changes; No Bloody Stools or melena  Genito-Urinary ROS: No Dysuria, Hematuria or Nocturia. No Urinary Incontinence or Vaginal Discharge  Musculoskeletal ROS: No Arthralgia, Arthritis,Gout,Osteoporosis or Rheumatism  Neurological ROS: No CVA, Migraines, Epilepsy, Seizure Hx, or Limb Weakness  Dermatological ROS: No Rash, Itching, Hives, Mole Changes or Cancer                                                                                                                                                                                                                                  PHYSICAL Exam:     Constitutional:  Vitals:    11/03/21 1113   BP: 116/82   Site: Left Upper Arm   Position: Sitting   Cuff Size: Large Adult   Weight: 236 lb (107 kg)   Height: 5' 3\" (1.6 m)       Chaperone for Intimate Exam   Chaperone was offered and accepted as part of the rooming process.  Chaperone: Karmen CABRERA          General Appearance: This  is a well Developed, well Nourished, well groomed female. Her BMI was reviewed.  Nutritional decision making was discussed. Skin:  There was a Normal Inspection of the skin without rashes or lesions. There were no rashes. (Papular, Maculopapular, Hives, Pustular, Macular)     There were no lesions (Ulcers, Erythema, Abn. Appearing Nevi)            Lymphatic:  No Lymph Nodes were Palpable in the neck , axilla or groin.  0 # Of Lymph Nodes; Location ; Character [Normal]  [Shotty] [Tender] [Enlarged]     Neck and EENT:  The neck was supple. There were no masses   The thyroid was not enlarged and had no masses. Perrla, EOMI B/L, TMI B/L No Abnormalities. Throat inspected-No exudates or Masses, Nares Patent No Masses        Respiratory: The lungs were auscultated and found to be clear. There were no rales, rhonchi or wheezes. There was a good respiratory effort. Cardiovascular: The heart was in a regular rate and rhythm. . No S3 or S4. There was no murmur appreciated. Location, grade, and radiation are not applicable. Extremities: The patients extremities were without calf tenderness, edema, or varicosities. There was full range of motion in all four extremities. Pulses in all four extremities were appreciated and are 2/4. Abdomen: The abdomen was soft and non-tender. There were good bowel sounds in all quadrants and there was no guarding, rebound or rigidity. On evaluation there was no evidence of hepatosplenomegaly and there was no costal vertebral johnathon tenderness bilaterally. No hernias were appreciated. Abdominal Scars: intact    Psych: The patient had a normal Orientation to: Time, Place, Person, and Situation  There is no Mood / Affect changes    Breast:  (Chest)  normal appearance, no masses or tenderness  Self breast exams were reviewed in detail. Literature was given.     Pelvic Exam:  Vulva and vagina appear normal. Bimanual reveals normal uterus and adnexa    two .5 cm x .5 cm scars noted on right vulva and one noted on left vulvar near vaginal introitus     Rectal Exam:  exam declined by patient          Musculosk:  Normal Gait and station was noted. Digits were evaluated without abnormal findings. Range of motion, stability and strength were evaluated and found to be appropriate for the patients age. ASSESSMENT:      27 y.o. Annual   Diagnosis Orders   1. Women's annual routine gynecological examination  norethindrone-ethinyl estradiol (DOYLE 1/20) 1-20 MG-MCG per tablet   2. Screen for STD (sexually transmitted disease)  VAGINITIS DNA PROBE    C.trachomatis N.gonorrhoeae DNA    Hepatitis B Surface Antigen    Hepatitis C Antibody    HERPES PROFILE    HIV Screen    T. pallidum Ab   3. Family planning, BCP (birth control pills) maintenance  norethindrone-ethinyl estradiol (DOYLE 1/20) 1-20 MG-MCG per tablet   4. Vulval lesion            Chief Complaint   Patient presents with    Annual Exam          Past Medical History:   Diagnosis Date    Headache     History of chlamydia     Hyperglycemia     Obesity          Patient Active Problem List    Diagnosis Date Noted    H/O:  2013     Priority: High     Repeat C/S  Low transverse      Type O blood, Rh negative 2012     Priority: High    Obesity, Class II, BMI 35-39.9 2018    Family history of diabetes mellitus 2018    Headache     Hyperglycemia     Low Platelet Count 0/3/82 2013     Platelets 433 on  repeat in 1 month  PFA wnl            Hereditary Breast, Ovarian, Colon and Uterine Cancer screening Done. Tobacco & Secondary smoke risks reviewed; instructed on cessation and avoidance      Counseling Completed:  Preventative Health Recommendations and Follow up. The patient was informed of the recommended preventative health recommendations. 1. Annuals every year; Cytology collections per prevailing guidelines. 2. Mammograms begin every year at 37 yo if no abnormalities are found and no family history. 3. Bone density studies every 2-3 years.  Begin at 71 yo. If no fracture history or osteoporosis family history. (significant). 4. Colonoscopy begin at 40 yo. Repeat every ten years if negative and no family history. 5. Calcium of 0866-6246 mg/day in split dosing  6. Vitamin D 400-800 IU/day  7. All other preventative health recommendations will be managed by the patients Primary care physician. PLAN:  Return in about 1 year (around 11/3/2022) for annual.   Lab slip given  Vaginal cultures collected   HRT signed  Denies a personal or family hx of a blood clot to the leg/lung/brain  Rx OCP  Repeat Annual every 1 year  Cervical Cytology Evaluation begins at 24years old. If Negative Cytology, Follow-up screening per current guidelines. Mammograms every 1 year. If 37 yo and last mammogram was negative. Calcium and Vitamin D dosing reviewed. Colonoscopy screening reviewed as well as onset for bone density testing. Birth control and barrier recommendations discussed. STD counseling and prevention reviewed. Gardisil counseling completed for all patients 10-37 yo. Routine health maintenance per patients PCP.   Orders Placed This Encounter   Procedures    VAGINITIS DNA PROBE     Standing Status:   Future     Standing Expiration Date:   11/2/2022    C.trachomatis N.gonorrhoeae DNA     Standing Status:   Future     Standing Expiration Date:   11/2/2022    Hepatitis B Surface Antigen     Standing Status:   Future     Standing Expiration Date:   11/3/2022    Hepatitis C Antibody     Standing Status:   Future     Standing Expiration Date:   11/3/2022    HERPES PROFILE     Standing Status:   Future     Standing Expiration Date:   12/3/2021    HIV Screen     Standing Status:   Future     Standing Expiration Date:   11/3/2022    T. pallidum Ab     Standing Status:   Future     Standing Expiration Date:   12/3/2021           The patient, Noy Pollack is a 27 y.o. female, was seen with a total time spent of 30 minutes for the visit on this date of

## 2021-11-04 PROBLEM — B00.9 HSV-1 INFECTION: Status: ACTIVE | Noted: 2021-11-04

## 2021-11-04 LAB
C TRACH DNA GENITAL QL NAA+PROBE: NEGATIVE
DIRECT EXAM: NORMAL
HERPES SIMPLEX VIRUS 1 IGG: 4.76
HERPES SIMPLEX VIRUS 2 IGG: 3.56
HERPES TYPE 1/2 IGM COMBINED: 2.57
Lab: NORMAL
N. GONORRHOEAE DNA: NEGATIVE
SPECIMEN DESCRIPTION: NORMAL
SPECIMEN DESCRIPTION: NORMAL

## 2021-11-05 ENCOUNTER — TELEPHONE (OUTPATIENT)
Dept: OBGYN CLINIC | Age: 26
End: 2021-11-05

## 2021-11-05 RX ORDER — VALACYCLOVIR HYDROCHLORIDE 500 MG/1
500 TABLET, FILM COATED ORAL 2 TIMES DAILY
Qty: 20 TABLET | Refills: 1 | Status: SHIPPED | OUTPATIENT
Start: 2021-11-05 | End: 2021-11-15

## 2021-11-05 NOTE — TELEPHONE ENCOUNTER
----- Message from EVIN Hammonds - NP sent at 11/4/2021  2:31 PM EDT -----  + HSV 1 & 2 and current;y having an outbreak   Valtrex 500 mg BID x 10 days with 1 refill

## 2022-01-19 ENCOUNTER — OFFICE VISIT (OUTPATIENT)
Dept: PRIMARY CARE CLINIC | Age: 27
End: 2022-01-19
Payer: MEDICARE

## 2022-01-19 VITALS
DIASTOLIC BLOOD PRESSURE: 78 MMHG | WEIGHT: 243.2 LBS | OXYGEN SATURATION: 98 % | HEIGHT: 63 IN | HEART RATE: 93 BPM | BODY MASS INDEX: 43.09 KG/M2 | SYSTOLIC BLOOD PRESSURE: 122 MMHG

## 2022-01-19 DIAGNOSIS — H10.9 BACTERIAL CONJUNCTIVITIS: Primary | ICD-10-CM

## 2022-01-19 PROCEDURE — G8484 FLU IMMUNIZE NO ADMIN: HCPCS | Performed by: PHYSICIAN ASSISTANT

## 2022-01-19 PROCEDURE — 1036F TOBACCO NON-USER: CPT | Performed by: PHYSICIAN ASSISTANT

## 2022-01-19 PROCEDURE — 99213 OFFICE O/P EST LOW 20 MIN: CPT | Performed by: PHYSICIAN ASSISTANT

## 2022-01-19 PROCEDURE — G8427 DOCREV CUR MEDS BY ELIG CLIN: HCPCS | Performed by: PHYSICIAN ASSISTANT

## 2022-01-19 PROCEDURE — G8417 CALC BMI ABV UP PARAM F/U: HCPCS | Performed by: PHYSICIAN ASSISTANT

## 2022-01-19 RX ORDER — CIPROFLOXACIN HYDROCHLORIDE 3.5 MG/ML
1 SOLUTION/ DROPS TOPICAL
Qty: 10 ML | Refills: 0 | Status: SHIPPED | OUTPATIENT
Start: 2022-01-19 | End: 2022-01-29

## 2022-01-19 ASSESSMENT — ENCOUNTER SYMPTOMS
DIARRHEA: 0
COUGH: 0
ABDOMINAL DISTENTION: 0
CONSTIPATION: 0
EYE DISCHARGE: 0
SHORTNESS OF BREATH: 0
SINUS PRESSURE: 0
RHINORRHEA: 0
ABDOMINAL PAIN: 0
VOMITING: 0
SORE THROAT: 0
PHOTOPHOBIA: 0
CHEST TIGHTNESS: 0

## 2022-01-19 NOTE — PROGRESS NOTES
[facility] PRIMARY CARE  [address] [facility] [address]  Dept: Chitra is a 27 y.o. female Established patient, who presents today for her medical conditions/complaints as noted below. Chief Complaint   Patient presents with    Conjunctivitis     started 22       HPI:     HPI: The patient is having pink eye with yellow discharge. Today was yellow discharge. Not effecting vision and no pain with eye movements. No fevers.      Reviewed prior notes None  Reviewed previous Labs    LDL Cholesterol (mg/dL)   Date Value   2020 86       (goal LDL is <100)   AST (U/L)   Date Value   2020 18     ALT (U/L)   Date Value   2020 18     BUN (mg/dL)   Date Value   2020 8     Hemoglobin A1C (%)   Date Value   2018 4.8     TSH (mIU/L)   Date Value   2021 1.56     BP Readings from Last 3 Encounters:   22 122/78   21 116/82   21 132/86          (goal 120/80)    Past Medical History:   Diagnosis Date    Headache     History of chlamydia     Hyperglycemia     Obesity       Past Surgical History:   Procedure Laterality Date     SECTION      x 2        Family History   Problem Relation Age of Onset    Depression Mother     Anxiety Disorder Mother     Diabetes Paternal Grandfather     Diabetes Paternal Grandmother     Diabetes Maternal Grandmother     COPD Maternal Grandmother     Hypertension Maternal Grandmother     Diabetes Maternal Grandfather     Coronary Art Dis Maternal Grandfather     Breast Cancer Neg Hx     Cancer Neg Hx     Colon Cancer Neg Hx     Eclampsia Neg Hx     Ovarian Cancer Neg Hx      Labor Neg Hx     Spont Abortions Neg Hx     Stroke Neg Hx        Social History     Tobacco Use    Smoking status: Never Smoker    Smokeless tobacco: Never Used   Substance Use Topics    Alcohol use: Yes     Comment: social      Current Outpatient Medications Medication Sig Dispense Refill    ciprofloxacin (CILOXAN) 0.3 % ophthalmic solution Place 1 drop into both eyes every 2 hours for 10 days 10 mL 0    norethindrone-ethinyl estradiol (DOYLE 1/20) 1-20 MG-MCG per tablet take 1 tablet by mouth once daily 28 tablet 12    escitalopram (LEXAPRO) 10 MG tablet take 1/2 tablet by mouth once daily 30 tablet 2    magnesium oxide (MAG-OX) 400 MG tablet take 1 tablet by mouth once daily 30 tablet 2    vitamin B-2 (RIBOFLAVIN) 100 MG TABS tablet take 1 tablet by mouth once daily 30 tablet 2    Omega-3 Fatty Acids (FISH OIL ADULT GUMMIES PO) Take by mouth      Probiotic Product (PROBIOTIC DAILY) CAPS Take 1 capsule by mouth daily. 30 capsule 2    Multiple Vitamins-Calcium (ONE-A-DAY WOMENS PO) Take by mouth       No current facility-administered medications for this visit. No Known Allergies    Health Maintenance   Topic Date Due    COVID-19 Vaccine (1) Never done    Flu vaccine (1) 09/01/2021    Depression Monitoring  02/09/2022    Pap smear  06/17/2022    DTaP/Tdap/Td vaccine (7 - Td or Tdap) 02/09/2028    Hepatitis B vaccine  Completed    Varicella vaccine  Completed    Hepatitis C screen  Completed    HIV screen  Completed    Hepatitis A vaccine  Aged Out    Hib vaccine  Aged Out    Meningococcal (ACWY) vaccine  Aged Out    Pneumococcal 0-64 years Vaccine  Aged Out       Subjective:      Review of Systems   Constitutional: Negative for chills, fever and unexpected weight change. HENT: Negative for congestion, hearing loss, rhinorrhea, sinus pressure and sore throat. Eyes: Negative for photophobia, discharge and visual disturbance. Respiratory: Negative for cough, chest tightness and shortness of breath. Cardiovascular: Negative for chest pain, palpitations and leg swelling. Gastrointestinal: Negative for abdominal distention, abdominal pain, constipation, diarrhea and vomiting. Endocrine: Negative for polydipsia and polyuria. Genitourinary: Negative for decreased urine volume, difficulty urinating, frequency and urgency. Musculoskeletal: Negative for arthralgias, gait problem and myalgias. Skin: Negative for rash. Allergic/Immunologic: Negative for food allergies. Neurological: Negative for dizziness, weakness, numbness and headaches. Hematological: Negative for adenopathy. Psychiatric/Behavioral: Negative for dysphoric mood and sleep disturbance. The patient is not nervous/anxious. Objective:     /78   Pulse 93   Ht 5' 3\" (1.6 m)   Wt 243 lb 3.2 oz (110.3 kg)   SpO2 98%   BMI 43.08 kg/m²   Physical Exam  Constitutional:       General: She is not in acute distress. Appearance: Normal appearance. She is not ill-appearing. HENT:      Head: Normocephalic and atraumatic. Right Ear: Tympanic membrane, ear canal and external ear normal.      Left Ear: Tympanic membrane, ear canal and external ear normal.      Nose: Nose normal.      Mouth/Throat:      Mouth: Mucous membranes are moist.   Eyes:      Extraocular Movements: Extraocular movements intact. Conjunctiva/sclera:      Right eye: No exudate. Left eye: Left conjunctiva is injected. Exudate present. Pupils: Pupils are equal, round, and reactive to light. Neck:      Vascular: No carotid bruit. Cardiovascular:      Rate and Rhythm: Normal rate and regular rhythm. Pulses: Normal pulses. Heart sounds: Normal heart sounds. Pulmonary:      Effort: Pulmonary effort is normal. No respiratory distress. Breath sounds: Normal breath sounds. Abdominal:      General: Bowel sounds are normal. There is no distension. Tenderness: There is no abdominal tenderness. Musculoskeletal:         General: Normal range of motion. Cervical back: Normal range of motion and neck supple. Lymphadenopathy:      Cervical: No cervical adenopathy. Skin:     General: Skin is warm and dry.    Neurological:      General: No focal deficit present. Mental Status: She is alert and oriented to person, place, and time. Psychiatric:         Mood and Affect: Mood normal.         Behavior: Behavior normal.         Thought Content: Thought content normal.         Assessment and Plan:          1. Bacterial conjunctivitis  -     ciprofloxacin (CILOXAN) 0.3 % ophthalmic solution; Place 1 drop into both eyes every 2 hours for 10 days, Disp-10 mL, R-0Normal             Patient given educational materials - see patient instructions. Discussed use, benefit, and side effects of prescribed medications. All patient questions answered. Pt voiced understanding. Reviewed health maintenance. Instructed to continue current medications, diet and exercise. Patient agreed with treatment plan. Follow up as directed.      Electronically signed by KACI Fox on 1/19/2022 at 10:13 AM

## 2022-03-22 DIAGNOSIS — F41.9 ANXIETY AND DEPRESSION: ICD-10-CM

## 2022-03-22 DIAGNOSIS — F32.A ANXIETY AND DEPRESSION: ICD-10-CM

## 2022-03-22 RX ORDER — ESCITALOPRAM OXALATE 10 MG/1
TABLET ORAL
Qty: 30 TABLET | Refills: 2 | Status: SHIPPED | OUTPATIENT
Start: 2022-03-22 | End: 2022-08-24 | Stop reason: SDUPTHER

## 2022-08-24 ENCOUNTER — PATIENT MESSAGE (OUTPATIENT)
Dept: PRIMARY CARE CLINIC | Age: 27
End: 2022-08-24

## 2022-08-24 DIAGNOSIS — F41.9 ANXIETY AND DEPRESSION: ICD-10-CM

## 2022-08-24 DIAGNOSIS — F32.A ANXIETY AND DEPRESSION: ICD-10-CM

## 2022-08-24 RX ORDER — ESCITALOPRAM OXALATE 10 MG/1
10 TABLET ORAL DAILY
Qty: 30 TABLET | Refills: 2 | Status: SHIPPED | OUTPATIENT
Start: 2022-08-24 | End: 2022-08-26 | Stop reason: SDUPTHER

## 2022-08-26 RX ORDER — ESCITALOPRAM OXALATE 10 MG/1
10 TABLET ORAL DAILY
Qty: 30 TABLET | Refills: 2 | Status: SHIPPED | OUTPATIENT
Start: 2022-08-26

## 2022-10-06 DIAGNOSIS — Z30.41 FAMILY PLANNING, BCP (BIRTH CONTROL PILLS) MAINTENANCE: ICD-10-CM

## 2022-10-06 DIAGNOSIS — Z01.419 WOMEN'S ANNUAL ROUTINE GYNECOLOGICAL EXAMINATION: ICD-10-CM

## 2022-10-06 RX ORDER — NORETHINDRONE ACETATE AND ETHINYL ESTRADIOL 1; .02 MG/1; MG/1
TABLET ORAL
Qty: 28 TABLET | Refills: 0 | Status: SHIPPED | OUTPATIENT
Start: 2022-10-06

## 2022-10-06 NOTE — TELEPHONE ENCOUNTER
Pt requesting refill on birthcontrol until annual 11/2022. Pt instructed to contact office to schedule a annual exam prior to additional refills.

## 2022-12-14 ENCOUNTER — OFFICE VISIT (OUTPATIENT)
Dept: OBGYN CLINIC | Age: 27
End: 2022-12-14
Payer: MEDICARE

## 2022-12-14 ENCOUNTER — HOSPITAL ENCOUNTER (OUTPATIENT)
Age: 27
Setting detail: SPECIMEN
Discharge: HOME | End: 2022-12-14

## 2022-12-14 VITALS
HEIGHT: 63 IN | WEIGHT: 253 LBS | SYSTOLIC BLOOD PRESSURE: 118 MMHG | DIASTOLIC BLOOD PRESSURE: 82 MMHG | BODY MASS INDEX: 44.83 KG/M2

## 2022-12-14 DIAGNOSIS — N89.8 VAGINAL DISCHARGE: ICD-10-CM

## 2022-12-14 DIAGNOSIS — N89.8 VAGINAL DISCHARGE: Primary | ICD-10-CM

## 2022-12-14 LAB
CANDIDA SPECIES, DNA PROBE: NEGATIVE
GARDNERELLA VAGINALIS, DNA PROBE: POSITIVE
SOURCE: ABNORMAL
TRICHOMONAS VAGINALIS DNA: NEGATIVE

## 2022-12-14 PROCEDURE — G8427 DOCREV CUR MEDS BY ELIG CLIN: HCPCS | Performed by: NURSE PRACTITIONER

## 2022-12-14 PROCEDURE — G8417 CALC BMI ABV UP PARAM F/U: HCPCS | Performed by: NURSE PRACTITIONER

## 2022-12-14 PROCEDURE — G8484 FLU IMMUNIZE NO ADMIN: HCPCS | Performed by: NURSE PRACTITIONER

## 2022-12-14 PROCEDURE — 1036F TOBACCO NON-USER: CPT | Performed by: NURSE PRACTITIONER

## 2022-12-14 PROCEDURE — 99213 OFFICE O/P EST LOW 20 MIN: CPT | Performed by: NURSE PRACTITIONER

## 2022-12-14 NOTE — PROGRESS NOTES
Leandro Vu 42  2022    YOB: 1995          The patient was seen today. She is here regarding vaginal discharge x 1 week. Denies odor, itching, irritation. Denies new partners . Her bowels are regular and she is voiding without difficulty. HPI:  Leandro Hyman is a 32 y.o. female  vaginal discharge       OB History    Para Term  AB Living   2 2 2 0 0 2   SAB IAB Ectopic Molar Multiple Live Births   0 0 0 0 0 2      # Outcome Date GA Lbr Jf/2nd Weight Sex Delivery Anes PTL Lv   2 Term 14 38w2d  6 lb 15.5 oz (3.161 kg) M CS-LTranv Spinal N ASHLEY      Birth Comments: (+) GBS, low platelet count, excessive fetal growth      Apgar1: 9  Apgar5: 9   1 Term 12 39w3d  7 lb 7 oz (3.374 kg) F CS-LTranv   ASHLEY      Birth Comments: System Generated. Please review and update pregnancy details.       Name: Teetee English       Past Medical History:   Diagnosis Date    Headache     History of chlamydia     Hyperglycemia     Obesity        Past Surgical History:   Procedure Laterality Date     SECTION      x 2        Family History   Problem Relation Age of Onset    Depression Mother     Anxiety Disorder Mother     Diabetes Paternal Grandfather     Diabetes Paternal Grandmother     Diabetes Maternal Grandmother     COPD Maternal Grandmother     Hypertension Maternal Grandmother     Diabetes Maternal Grandfather     Coronary Art Dis Maternal Grandfather     Breast Cancer Neg Hx     Cancer Neg Hx     Colon Cancer Neg Hx     Eclampsia Neg Hx     Ovarian Cancer Neg Hx      Labor Neg Hx     Spont Abortions Neg Hx     Stroke Neg Hx        Social History     Socioeconomic History    Marital status: Single     Spouse name: Not on file    Number of children: Not on file    Years of education: Not on file    Highest education level: Not on file   Occupational History    Not on file   Tobacco Use    Smoking status: Never    Smokeless tobacco: Never   Vaping Use    Vaping Use: Never used   Substance and Sexual Activity    Alcohol use: Yes     Comment: social    Drug use: Yes     Frequency: 4.0 times per week     Types: Marijuana J Carlos Shoemaker)    Sexual activity: Yes     Partners: Male     Birth control/protection: Pill   Other Topics Concern    Not on file   Social History Narrative    Not on file     Social Determinants of Health     Financial Resource Strain: Not on file   Food Insecurity: Not on file   Transportation Needs: Not on file   Physical Activity: Not on file   Stress: Not on file   Social Connections: Not on file   Intimate Partner Violence: Not on file   Housing Stability: Not on file         MEDICATIONS:  Current Outpatient Medications   Medication Sig Dispense Refill    norethindrone-ethinyl estradiol (DOYLE 1/20) 1-20 MG-MCG per tablet take 1 tablet by mouth once daily 28 tablet 0    escitalopram (LEXAPRO) 10 MG tablet Take 1 tablet by mouth daily 30 tablet 2    magnesium oxide (MAG-OX) 400 MG tablet take 1 tablet by mouth once daily 30 tablet 2    vitamin B-2 (RIBOFLAVIN) 100 MG TABS tablet take 1 tablet by mouth once daily 30 tablet 2    Omega-3 Fatty Acids (FISH OIL ADULT GUMMIES PO) Take by mouth      Probiotic Product (PROBIOTIC DAILY) CAPS Take 1 capsule by mouth daily. 30 capsule 2    Multiple Vitamins-Calcium (ONE-A-DAY WOMENS PO) Take by mouth       No current facility-administered medications for this visit. ALLERGIES:  Allergies as of 12/14/2022    (No Known Allergies)         REVIEW OF SYSTEMS:    see problem list   A minimum of an eleven point review of systems was completed. Review Of Systems (11 point):  Constitutional: No fever, chills or malaise;  No weight change or fatigue  Head and Eyes: No vision, Headache, Dizziness or trauma in last 12 months  ENT ROS: No hearing, Tinnitis, sinus or taste problems  Hematological and Lymphatic ROS:No Lymphoma, Von Willebrand's, Hemophillia or Bleeding History  Psych ROS: No Depression, Homicidal thoughts,suicidal thoughts, or anxiety  Breast ROS: No prior breast abnormalities or lumps  Respiratory ROS: No SOB, Pneumoniae,Cough, or Pulmonary Embolism History  Cardiovascular ROS: No Chest Pain with Exertion, Palpitations, Syncope, Edema, Arrhythmia  Gastrointestinal ROS: No Indigestion, Heartburn, Nausea, vomiting, Diarrhea, Constipation,or Bowel Changes; No Bloody Stools or melena  Genito-Urinary ROS: No Dysuria, Hematuria or Nocturia. No Urinary Incontinence or Vaginal Discharge  Musculoskeletal ROS: No Arthralgia, Arthritis,Gout,Osteoporosis or Rheumatism  Neurological ROS: No CVA, Migraines, Epilepsy, Seizure Hx, or Limb Weakness  Dermatological ROS: No Rash, Itching, Hives, Mole Changes or Cancer          Blood pressure 118/82, height 5' 3\" (1.6 m), weight 253 lb (114.8 kg), last menstrual period 11/25/2022, not currently breastfeeding. Chaperone for Intimate Exam  Chaperone was offered and accepted as part of the rooming process. Chaperone: Shahrzad Shah MA         Abdomen: Soft non-tender; good bowel sounds. No guarding, rebound or rigidity. No CVA tenderness bilaterally. Extremities: No calf tenderness, DTR 2/4, and No edema bilaterally    Pelvic: Vulva and vagina appear normal. Bimanual exam reveals normal uterus and adnexa. Diagnostics:  No results found. Lab Results:  Results for orders placed or performed during the hospital encounter of 11/03/21   C.trachomatis N.gonorrhoeae DNA    Specimen: Cervix   Result Value Ref Range    Specimen Description . CERVIX     C. trachomatis DNA NEGATIVE NEGATIVE    N. gonorrhoeae DNA NEGATIVE NEGATIVE   VAGINITIS DNA PROBE    Specimen: Vaginal   Result Value Ref Range    Specimen Description . VAGINA     Special Requests NOT REPORTED     Direct Exam NEGATIVE for Candida sp.      Direct Exam NEGATIVE for Gardnerella vaginalis     Direct Exam NEGATIVE for Trichomonas vaginalis     Direct Exam       Method of testing is a DNA probe intended for detection and identification of Candida species, Gardnerella vaginalis, and Trichomonas vaginalis nucleic acid in vaginal fluid specimens from patients with symptoms of vaginitis/vaginosis. Assessment:   Diagnosis Orders   1. Vaginal discharge  C.trachomatis N.gonorrhoeae DNA    Vaginitis DNA Probe        Patient Active Problem List    Diagnosis Date Noted    H/O:  2013     Priority: High     Repeat C/S  Low transverse      Type O blood, Rh negative 2012     Priority: High    HSV-1 & 2 infection 2021    Obesity, Class II, BMI 35-39.9 2018    Family history of diabetes mellitus 2018    Headache     Hyperglycemia     Low Platelet Count 2013     Platelets 601 on  repeat in 1 month  PFA wnl             PLAN:  Return in about 4 weeks (around 2023) for ob visit. Vaginal cultures collected   Repeat Annual every 1 year  Cervical Cytology Evaluation begins at 22years old. If Negative Cytology, Follow-up screening per current guidelines. Return to the office in 4 weeks. Counseled on preventative health maintenance follow-up. Orders Placed This Encounter   Procedures    C.trachomatis N.gonorrhoeae DNA     Standing Status:   Future     Standing Expiration Date:   2023    Vaginitis DNA Probe     Standing Status:   Future     Standing Expiration Date:   2023           The patient, Radha Montenegro is a 32 y.o. female, was seen with a total time spent of 20 minutes for the visit on this date of service by the E/M provider. The time component had both face to face and non face to face time spent in determining the total time component. Counseling and education regarding her diagnosis listed below and her options regarding those diagnoses were also included in determining her time component. Diagnosis Orders   1.  Vaginal discharge  C.trachomatis N.gonorrhoeae DNA    Vaginitis DNA Probe           The patient had her preventative health maintenance recommendations and follow-up reviewed with her at the completion of her visit.

## 2022-12-15 ENCOUNTER — TELEPHONE (OUTPATIENT)
Dept: OBGYN CLINIC | Age: 27
End: 2022-12-15

## 2022-12-15 RX ORDER — METRONIDAZOLE 500 MG/1
500 TABLET ORAL 2 TIMES DAILY
Qty: 14 TABLET | Refills: 0 | Status: SHIPPED | OUTPATIENT
Start: 2022-12-15 | End: 2022-12-22

## 2022-12-15 RX ORDER — DOXYCYCLINE HYCLATE 100 MG
100 TABLET ORAL 2 TIMES DAILY
Qty: 14 TABLET | Refills: 0 | Status: SHIPPED | OUTPATIENT
Start: 2022-12-15 | End: 2022-12-22

## 2022-12-15 NOTE — TELEPHONE ENCOUNTER
----- Message from EVIN Farmer NP sent at 12/15/2022  8:11 AM EST -----  + BV  Flagyl 500mg PO BID x 7 days

## 2022-12-15 NOTE — TELEPHONE ENCOUNTER
Per Rachel Millan NP, pt notified of +BV; RX for flagyl to be sent to pt pharmacy. Pt verbalized understanding.

## 2022-12-15 NOTE — TELEPHONE ENCOUNTER
Per Michelle Cr NP, pt notified of +chlamydia; RX for doxycycline to be sent to pt pharmacy. Pt to abstain from intercourse, partner to be treated and JERRELL in 2-3 weeks. Pt verbalized understanding and has appt on 12/29/22 for pap; will do JERRELL then.

## 2022-12-15 NOTE — TELEPHONE ENCOUNTER
----- Message from EVIN Ram NP sent at 12/15/2022 11:54 AM EST -----  + Chlamydia   Doxycycline 100 mg PO BID x 7 days   JERRELL in 2-3 weeks  Abstain  Partner needs treated

## 2022-12-26 DIAGNOSIS — Z30.41 FAMILY PLANNING, BCP (BIRTH CONTROL PILLS) MAINTENANCE: ICD-10-CM

## 2022-12-26 DIAGNOSIS — Z01.419 WOMEN'S ANNUAL ROUTINE GYNECOLOGICAL EXAMINATION: ICD-10-CM

## 2022-12-27 RX ORDER — NORETHINDRONE ACETATE AND ETHINYL ESTRADIOL 1; .02 MG/1; MG/1
TABLET ORAL
Qty: 28 TABLET | Refills: 0 | Status: SHIPPED | OUTPATIENT
Start: 2022-12-27

## 2023-01-12 ENCOUNTER — OFFICE VISIT (OUTPATIENT)
Dept: OBGYN CLINIC | Age: 28
End: 2023-01-12
Payer: MEDICARE

## 2023-01-12 ENCOUNTER — HOSPITAL ENCOUNTER (OUTPATIENT)
Age: 28
Setting detail: SPECIMEN
Discharge: HOME | End: 2023-01-12

## 2023-01-12 VITALS
WEIGHT: 255 LBS | DIASTOLIC BLOOD PRESSURE: 86 MMHG | HEIGHT: 63 IN | SYSTOLIC BLOOD PRESSURE: 122 MMHG | BODY MASS INDEX: 45.18 KG/M2

## 2023-01-12 DIAGNOSIS — Z01.419 VISIT FOR GYNECOLOGIC EXAMINATION: Primary | ICD-10-CM

## 2023-01-12 DIAGNOSIS — Z20.2 CHLAMYDIA CONTACT: ICD-10-CM

## 2023-01-12 DIAGNOSIS — Z01.419 VISIT FOR GYNECOLOGIC EXAMINATION: ICD-10-CM

## 2023-01-12 PROBLEM — G43.909 MIGRAINE: Status: ACTIVE | Noted: 2023-01-12

## 2023-01-12 PROCEDURE — G8484 FLU IMMUNIZE NO ADMIN: HCPCS | Performed by: NURSE PRACTITIONER

## 2023-01-12 PROCEDURE — 99395 PREV VISIT EST AGE 18-39: CPT | Performed by: NURSE PRACTITIONER

## 2023-01-12 RX ORDER — ACETAMINOPHEN AND CODEINE PHOSPHATE 120; 12 MG/5ML; MG/5ML
1 SOLUTION ORAL DAILY
Qty: 28 TABLET | Refills: 11 | Status: SHIPPED | OUTPATIENT
Start: 2023-01-12 | End: 2024-01-12

## 2023-01-12 NOTE — PROGRESS NOTES
History and Physical  [address], [address] [facility] [address]. [phone]   Fax [phone]  Ashlee Saldana  2023              32 y.o. Chief Complaint   Patient presents with    Annual Exam       Patient's last menstrual period was 2022. Primary Care Physician: KACI Fu    The patient was seen and examined. She has no chief complaint today and is here for her annual exam.  Her bowels are regular. There are no voiding complaints. She denies any bloating. She denies vaginal discharge and was counseled on STD's and the need for barrier contraception. HPI : Ashlee Saldana is a 32 y.o. female [id number]    Annual exam  Hx migraines per patient   Currently on OCP- desires to change to POP  JERRELL for + CT  States both her and her partner were treated   Has had intercourse since tx  No CC    no Bloating  no Early Satiety  no Unexplained weight change of more than 15 lbs  no  PMB  no  PCB  ________________________________________________________________________  OB History    Para Term  AB Living   2 2 2 0 0 2   SAB IAB Ectopic Molar Multiple Live Births   0 0 0 0 0 2      # Outcome Date GA Lbr Jf/2nd Weight Sex Delivery Anes PTL Lv   2 Term 14 38w2d  6 lb 15.5 oz (3.161 kg) M CS-LTranv Spinal N ASHLEY      Birth Comments: (+) GBS, low platelet count, excessive fetal growth      Apgar1: 9  Apgar5: 9   1 Term 12 39w3d  7 lb 7 oz (3.374 kg) F CS-LTranv   ASHLEY      Birth Comments: System Generated. Please review and update pregnancy details.       Name: Larry Nguyen     Past Medical History:   Diagnosis Date    Headache     History of chlamydia     Hyperglycemia     Obesity                                                                    Past Surgical History:   Procedure Laterality Date     SECTION      x 2      Family History   Problem Relation Age of Onset    Depression Mother     Anxiety Disorder Mother     Diabetes Paternal Grandfather     Diabetes Paternal Grandmother     Diabetes Maternal Grandmother     COPD Maternal Grandmother     Hypertension Maternal Grandmother     Diabetes Maternal Grandfather     Coronary Art Dis Maternal Grandfather     Breast Cancer Neg Hx     Cancer Neg Hx     Colon Cancer Neg Hx     Eclampsia Neg Hx     Ovarian Cancer Neg Hx      Labor Neg Hx     Spont Abortions Neg Hx     Stroke Neg Hx      Social History     Socioeconomic History    Marital status: Single     Spouse name: Not on file    Number of children: Not on file    Years of education: Not on file    Highest education level: Not on file   Occupational History    Not on file   Tobacco Use    Smoking status: Never    Smokeless tobacco: Never   Vaping Use    Vaping Use: Never used   Substance and Sexual Activity    Alcohol use: Yes     Comment: social    Drug use: Yes     Frequency: 4.0 times per week     Types: Marijuana (Weed)    Sexual activity: Yes     Partners: Male     Birth control/protection: Pill   Other Topics Concern    Not on file   Social History Narrative    Not on file     Social Determinants of Health     Financial Resource Strain: Not on file   Food Insecurity: Not on file   Transportation Needs: Not on file   Physical Activity: Not on file   Stress: Not on file   Social Connections: Not on file   Intimate Partner Violence: Not on file   Housing Stability: Not on file       MEDICATIONS:  Current Outpatient Medications   Medication Sig Dispense Refill    norethindrone (ORTHO MICRONOR) 0.35 MG tablet Take 1 tablet by mouth daily 28 tablet 11    norethindrone-ethinyl estradiol (DOYLE 1/20) 1-20 MG-MCG per tablet take 1 tablet by mouth once daily 28 tablet 0    escitalopram (LEXAPRO) 10 MG tablet Take 1 tablet by mouth daily 30 tablet 2    magnesium oxide (MAG-OX) 400 MG tablet take 1 tablet by mouth once daily 30 tablet 2    vitamin B-2 (RIBOFLAVIN) 100 MG TABS tablet take 1 tablet by mouth once daily 30 tablet 2    Omega-3 Fatty Acids (FISH OIL ADULT GUMMIES PO) Take by mouth      Probiotic Product (PROBIOTIC DAILY) CAPS Take 1 capsule by mouth daily. 30 capsule 2    Multiple Vitamins-Calcium (ONE-A-DAY WOMENS PO) Take by mouth       No current facility-administered medications for this visit. ALLERGIES:  Allergies as of 01/12/2023    (No Known Allergies)       Symptoms of decreased mood absent  Symptoms of anhedonia absent    **If either question is answered in a  positive fashion then complete the PHQ9 Scoring Evaluation and make the appropriate referral**      Immunization status: up to date and documented. Gynecologic History:  Menarche: 3 yo  Menopause at [address] yo     Patient's last menstrual period was 12/28/2022. Sexually Active: Yes    STD History: Yes Hsv and CT     Permanent Sterilization: No   Reversible Birth Control: Yes OCP        Hormone Replacement Exposure: No      Genetic Qualified Family History of Breast, Ovarian , Colon or Uterine Cancer: No     If YES see scanned worksheet. Preventative Health Testing:    Health Maintenance:  Health Maintenance Due   Topic Date Due    COVID-19 Vaccine (1) Never done    Depression Monitoring  02/09/2022    Pap smear  06/17/2022    Flu vaccine (1) 08/01/2022       Date of Last Pap Smear: 2019 neg  Abnormal Pap Smear History: denies   Colposcopy History:   Date of Last Mammogram: denies   Date of Last Colonoscopy:   Date of Last Bone Density:      ________________________________________________________________________        REVIEW OF SYSTEMS:    see problem list   A minimum of an eleven point review of systems was completed. Review Of Systems (11 point):  Constitutional: No fever, chills or malaise;  No weight change or fatigue  Head and Eyes: No vision changes, Headache, Dizziness or trauma in last 12 months  ENT ROS: No hearing, Tinnitis, sinus or taste problems  Hematological and Lymphatic ROS:No Lymphoma, Von Willebrand's, Hemophillia or Bleeding History  Psych ROS: No Depression, Homicidal thoughts,suicidal thoughts, or anxiety  Breast ROS: No breast abnormalities or lumps  Respiratory ROS: No SOB, Pneumoniae,Cough, or Pulmonary Embolism   Cardiovascular ROS: No Chest Pain with Exertion, Palpitations, Syncope, Edema, Arrhythmia  Gastrointestinal ROS: No Indigestion, Heartburn, Nausea, vomiting, Diarrhea, Constipation,or Bowel Changes; No Bloody Stools or melena  Genito-Urinary ROS: No Dysuria, Hematuria or Nocturia. No Urinary Incontinence or Vaginal Discharge  Musculoskeletal ROS: No Arthralgia, Arthritis,Gout,Osteoporosis or Rheumatism  Neurological ROS: No CVA, Migraines, Epilepsy, Seizure Hx, or Limb Weakness  Dermatological ROS: No Rash, Itching, Hives, Mole Changes or Cancer                                                                                                                                                                                                                                  PHYSICAL Exam:     Constitutional:  Vitals:    01/12/23 0938   BP: 122/86   Weight: 255 lb (115.7 kg)   Height: 5' 3\" (1.6 m)       Chaperone for Intimate Exam  Chaperone was offered and accepted as part of the rooming process. Chaperone: Lety Granados MA          General Appearance: This  is a well Developed, well Nourished, well groomed female. Her BMI was reviewed. Nutritional decision making was discussed. Skin:  There was a Normal Inspection of the skin without rashes or lesions. There were no rashes. (Papular, Maculopapular, Hives, Pustular, Macular)     There were no lesions (Ulcers, Erythema, Abn. Appearing Nevi)            Lymphatic:  No Lymph Nodes were Palpable in the neck , axilla or groin.  0 # Of Lymph Nodes; Location ; Character [Normal]  [Shotty] [Tender] [Enlarged]     Neck and EENT:  The neck was supple. There were no masses   The thyroid was not enlarged and had no masses.   Perrla, EOMI B/L, TMI B/L No Abnormalities. Throat inspected-No exudates or Masses, Nares Patent No Masses        Respiratory: The lungs were auscultated and found to be clear. There were no rales, rhonchi or wheezes. There was a good respiratory effort. Cardiovascular: The heart was in a regular rate and rhythm. . No S3 or S4. There was no murmur appreciated. Location, grade, and radiation are not applicable. Extremities: The patients extremities were without calf tenderness, edema, or varicosities. There was full range of motion in all four extremities. Pulses in all four extremities were appreciated and are 2/4. Abdomen: The abdomen was soft and non-tender. There were good bowel sounds in all quadrants and there was no guarding, rebound or rigidity. On evaluation there was no evidence of hepatosplenomegaly and there was no costal vertebral johnathon tenderness bilaterally. No hernias were appreciated. Abdominal Scars: intact    Psych: The patient had a normal Orientation to: Time, Place, Person, and Situation  There is no Mood / Affect changes    Breast:  (Chest)  normal appearance, no masses or tenderness, Inspection negative, No nipple retraction or dimpling, No nipple discharge or bleeding, No axillary or supraclavicular adenopathy, Normal to palpation without dominant masses  Self breast exams were reviewed in detail. Literature was given. Pelvic Exam:  External genitalia: normal general appearance  Urinary system: urethral meatus normal  Vaginal: normal mucosa without prolapse or lesions  Cervix: normal appearance  Adnexa: normal bimanual exam  Uterus: normal single, nontender    Rectal Exam:  exam declined by patient          Musculosk:  Normal Gait and station was noted. Digits were evaluated without abnormal findings. Range of motion, stability and strength were evaluated and found to be appropriate for the patients age. ASSESSMENT:      32 y.o. Annual   Diagnosis Orders   1.  Visit for gynecologic examination  PAP Smear      2. Chlamydia contact  C.trachomatis N.gonorrhoeae DNA    Vaginitis DNA Probe             Chief Complaint   Patient presents with    Annual Exam          Past Medical History:   Diagnosis Date    Headache     History of chlamydia     Hyperglycemia     Obesity          Patient Active Problem List    Diagnosis Date Noted    H/O:  2013     Priority: High     Repeat C/S  Low transverse      Type O blood, Rh negative 2012     Priority: High    Migraine 2023     Priority: Medium    HSV-1 & 2 infection 2021    Obesity, Class II, BMI 35-39.9 2018    Family history of diabetes mellitus 2018    Headache     Hyperglycemia     Low Platelet Count 6/9/15 2013     Platelets 599 on 6936 repeat in 1 month  PFA wnl            Hereditary Breast, Ovarian, Colon and Uterine Cancer screening Done. Tobacco & Secondary smoke risks reviewed; instructed on cessation and avoidance      Counseling Completed:  Preventative Health Recommendations and Follow up. The patient was informed of the recommended preventative health recommendations. 1. Annuals every year; Cytology collections per prevailing guidelines. 2. Mammograms begin every year at 37 yo if no abnormalities are found and no family history. 3. Bone density studies every 2-3 years. Begin at 71 yo. If no fracture history or osteoporosis family history. (significant). 4. Colonoscopy begin at 38 yo. Repeat every ten years if negative and no family history. 5. Calcium of 2341-9242 mg/day in split dosing  6. Vitamin D 400-800 IU/day  7. All other preventative health recommendations will be managed by the patients Primary care physician.             PLAN:  Return in about 1 year (around 2024) for annual.  Pap smear collected  Vaginal cultures collected  HRT signed  Denies a personal or family hx of a blood clot to the leg/lung/brain  Rx POP  Repeat Annual every 1 year  Cervical Cytology Evaluation begins at 24years old. If Negative Cytology, Follow-up screening per current guidelines. Mammograms every 1 year. If 35 yo and last mammogram was negative. Calcium and Vitamin D dosing reviewed. Colonoscopy screening reviewed as well as onset for bone density testing. Birth control and barrier recommendations discussed. STD counseling and prevention reviewed. Gardisil counseling completed for all patients 10-35 yo. Routine health maintenance per patients PCP. Orders Placed This Encounter   Procedures    C.trachomatis N.gonorrhoeae DNA     Standing Status:   Future     Standing Expiration Date:   2024    Vaginitis DNA Probe     Standing Status:   Future     Standing Expiration Date:   2024    PAP Smear     Patient History:    No LMP recorded. OBGYN Status: Having periods  Past Surgical History:  No date:  SECTION      Comment:  x 2   Medications/Contraceptives Affecting Cytology     Combination Contraceptives - Oral Disp Start End     norethindrone-ethinyl estradiol (DOYLE ) 1-20 MG-MCG per tablet    28 tablet 2022     Sig: take 1 tablet by mouth once daily    Notes to Pharmacy: Patient will need an appointment for further refills    Renewals     Renewal provider: EVIN Jimenez - NP            Problem List        Edg Problems Affecting Cytology    RESOLVED: H/O chlamydia infection    Overview Signed 2013 10:43 AM by EVIN Jennings CNM     2013 GC and Chlamydia cultures negative        Social History    Tobacco Use      Smoking status: Never      Smokeless tobacco: Never       Standing Status:   Future     Standing Expiration Date:   2023     Order Specific Question:   Collection Type     Answer: Thin Prep     Order Specific Question:   Prior Abnormal Pap Test     Answer:   No     Order Specific Question:   Screening or Diagnostic     Answer:   Screening     Order Specific Question:   HPV Requested?      Answer:   Yes - If Abnormal Reflex HPV     Order Specific Question:   High Risk Patient     Answer:   N/A           The patient, Shaneka Sousa is a 32 y.o. female, was seen with a total time spent of 30 minutes for the visit on this date of service by the E/M provider. The time component had both face to face and non face to face time spent in determining the total time component. Counseling and education regarding her diagnosis listed below and her options regarding those diagnoses were also included in determining her time component. Diagnosis Orders   1. Visit for gynecologic examination  PAP Smear      2. Chlamydia contact  C.trachomatis N.gonorrhoeae DNA    Vaginitis DNA Probe           The patient had her preventative health maintenance recommendations and follow-up reviewed with her at the completion of her visit.

## 2023-01-13 NOTE — TELEPHONE ENCOUNTER
----- Message from EVIN Rendon CNP sent at 1/13/2023  8:02 AM EST -----  +BV- flagyl 500mg Po BID x7 days

## 2023-01-16 RX ORDER — METRONIDAZOLE 500 MG/1
500 TABLET ORAL 2 TIMES DAILY
Qty: 14 TABLET | Refills: 0 | Status: SHIPPED | OUTPATIENT
Start: 2023-01-16 | End: 2023-01-23

## 2023-01-23 DIAGNOSIS — Z30.41 FAMILY PLANNING, BCP (BIRTH CONTROL PILLS) MAINTENANCE: ICD-10-CM

## 2023-01-23 DIAGNOSIS — Z01.419 WOMEN'S ANNUAL ROUTINE GYNECOLOGICAL EXAMINATION: ICD-10-CM

## 2023-01-23 RX ORDER — NORETHINDRONE ACETATE AND ETHINYL ESTRADIOL 1; .02 MG/1; MG/1
TABLET ORAL
Qty: 21 TABLET | Refills: 11 | Status: SHIPPED | OUTPATIENT
Start: 2023-01-23

## 2023-01-23 NOTE — TELEPHONE ENCOUNTER
Annual was 1/12/23, requesting refill on birth control. I was present for and supervised the key/critical aspects of the procedures performed during the care of the patient.

## 2023-02-10 RX ORDER — ACETAMINOPHEN AND CODEINE PHOSPHATE 120; 12 MG/5ML; MG/5ML
1 SOLUTION ORAL DAILY
Qty: 28 TABLET | Refills: 11 | Status: SHIPPED | OUTPATIENT
Start: 2023-02-10 | End: 2024-02-10

## 2023-02-10 NOTE — TELEPHONE ENCOUNTER
Per Darci Later pt to get Ortho Micronor sent to pt pharm. Pt instructed via [employer] message to d/c combination pill.

## 2023-03-01 DIAGNOSIS — F41.9 ANXIETY AND DEPRESSION: ICD-10-CM

## 2023-03-01 DIAGNOSIS — F32.A ANXIETY AND DEPRESSION: ICD-10-CM

## 2023-03-01 RX ORDER — ESCITALOPRAM OXALATE 10 MG/1
TABLET ORAL
Qty: 30 TABLET | Refills: 2 | OUTPATIENT
Start: 2023-03-01

## 2023-03-07 ENCOUNTER — OFFICE VISIT (OUTPATIENT)
Dept: PRIMARY CARE CLINIC | Age: 28
End: 2023-03-07
Payer: MEDICAID

## 2023-03-07 VITALS
BODY MASS INDEX: 45.89 KG/M2 | DIASTOLIC BLOOD PRESSURE: 84 MMHG | WEIGHT: 259 LBS | HEART RATE: 89 BPM | OXYGEN SATURATION: 98 % | HEIGHT: 63 IN | SYSTOLIC BLOOD PRESSURE: 116 MMHG

## 2023-03-07 DIAGNOSIS — F32.A ANXIETY AND DEPRESSION: ICD-10-CM

## 2023-03-07 DIAGNOSIS — G43.019 INTRACTABLE MIGRAINE WITHOUT AURA AND WITHOUT STATUS MIGRAINOSUS: ICD-10-CM

## 2023-03-07 DIAGNOSIS — E66.01 CLASS 3 SEVERE OBESITY WITHOUT SERIOUS COMORBIDITY WITH BODY MASS INDEX (BMI) OF 45.0 TO 49.9 IN ADULT, UNSPECIFIED OBESITY TYPE (HCC): Primary | ICD-10-CM

## 2023-03-07 DIAGNOSIS — F41.9 ANXIETY AND DEPRESSION: ICD-10-CM

## 2023-03-07 DIAGNOSIS — E66.01 CLASS 3 SEVERE OBESITY WITHOUT SERIOUS COMORBIDITY WITH BODY MASS INDEX (BMI) OF 45.0 TO 49.9 IN ADULT, UNSPECIFIED OBESITY TYPE (HCC): ICD-10-CM

## 2023-03-07 PROBLEM — E66.813 CLASS 3 SEVERE OBESITY WITHOUT SERIOUS COMORBIDITY WITH BODY MASS INDEX (BMI) OF 45.0 TO 49.9 IN ADULT: Status: ACTIVE | Noted: 2020-11-03

## 2023-03-07 LAB
ABSOLUTE EOS #: 0.23 K/UL (ref 0–0.44)
ABSOLUTE IMMATURE GRANULOCYTE: 0.03 K/UL (ref 0–0.3)
ABSOLUTE LYMPH #: 1.83 K/UL (ref 1.1–3.7)
ABSOLUTE MONO #: 0.53 K/UL (ref 0.1–1.2)
ALBUMIN SERPL-MCNC: 4.3 G/DL (ref 3.5–5.2)
ALBUMIN/GLOBULIN RATIO: 1.4 (ref 1–2.5)
ALP BLD-CCNC: 70 U/L (ref 35–104)
ALT SERPL-CCNC: 34 U/L (ref 5–33)
ANION GAP SERPL CALCULATED.3IONS-SCNC: 10 MMOL/L (ref 9–17)
AST SERPL-CCNC: 28 U/L
BASOPHILS # BLD: 1 % (ref 0–2)
BASOPHILS ABSOLUTE: 0.09 K/UL (ref 0–0.2)
BILIRUB SERPL-MCNC: 0.3 MG/DL (ref 0.3–1.2)
BUN BLDV-MCNC: 9 MG/DL (ref 6–20)
CALCIUM SERPL-MCNC: 9.4 MG/DL (ref 8.6–10.4)
CHLORIDE BLD-SCNC: 107 MMOL/L (ref 98–107)
CHOLESTEROL, FASTING: 170 MG/DL
CHOLESTEROL/HDL RATIO: 4.6
CO2: 23 MMOL/L (ref 20–31)
CREAT SERPL-MCNC: 0.46 MG/DL (ref 0.5–0.9)
EOSINOPHILS RELATIVE PERCENT: 2 % (ref 1–4)
GFR SERPL CREATININE-BSD FRML MDRD: >60 ML/MIN/1.73M2
GLUCOSE BLD-MCNC: 99 MG/DL (ref 70–99)
HCT VFR BLD CALC: 44.9 % (ref 36.3–47.1)
HDLC SERPL-MCNC: 37 MG/DL
HEMOGLOBIN: 14.1 G/DL (ref 11.9–15.1)
IMMATURE GRANULOCYTES: 0 %
LDL CHOLESTEROL: 95 MG/DL (ref 0–130)
LYMPHOCYTES # BLD: 19 % (ref 24–43)
MCH RBC QN AUTO: 27.4 PG (ref 25.2–33.5)
MCHC RBC AUTO-ENTMCNC: 31.4 G/DL (ref 28.4–34.8)
MCV RBC AUTO: 87.4 FL (ref 82.6–102.9)
MONOCYTES # BLD: 6 % (ref 3–12)
NRBC AUTOMATED: 0 PER 100 WBC
PDW BLD-RTO: 13.3 % (ref 11.8–14.4)
PLATELET # BLD: 248 K/UL (ref 138–453)
PMV BLD AUTO: 10.8 FL (ref 8.1–13.5)
POTASSIUM SERPL-SCNC: 4 MMOL/L (ref 3.7–5.3)
RBC # BLD: 5.14 M/UL (ref 3.95–5.11)
SEG NEUTROPHILS: 72 % (ref 36–65)
SEGMENTED NEUTROPHILS ABSOLUTE COUNT: 6.82 K/UL (ref 1.5–8.1)
SODIUM BLD-SCNC: 140 MMOL/L (ref 135–144)
TOTAL PROTEIN: 7.4 G/DL (ref 6.4–8.3)
TRIGLYCERIDE, FASTING: 192 MG/DL
TSH SERPL DL<=0.05 MIU/L-ACNC: 1.76 UIU/ML (ref 0.3–5)
WBC # BLD: 9.5 K/UL (ref 3.5–11.3)

## 2023-03-07 PROCEDURE — 99214 OFFICE O/P EST MOD 30 MIN: CPT | Performed by: PHYSICIAN ASSISTANT

## 2023-03-07 RX ORDER — ESCITALOPRAM OXALATE 10 MG/1
10 TABLET ORAL DAILY
Qty: 90 TABLET | Refills: 3 | Status: SHIPPED | OUTPATIENT
Start: 2023-03-07

## 2023-03-07 ASSESSMENT — ENCOUNTER SYMPTOMS
PHOTOPHOBIA: 0
RHINORRHEA: 0
CHEST TIGHTNESS: 0
COUGH: 0
SHORTNESS OF BREATH: 0
SORE THROAT: 0
SINUS PRESSURE: 0

## 2023-03-07 ASSESSMENT — PATIENT HEALTH QUESTIONNAIRE - PHQ9
SUM OF ALL RESPONSES TO PHQ QUESTIONS 1-9: 0
SUM OF ALL RESPONSES TO PHQ9 QUESTIONS 1 & 2: 0
SUM OF ALL RESPONSES TO PHQ QUESTIONS 1-9: 0
SUM OF ALL RESPONSES TO PHQ QUESTIONS 1-9: 0
2. FEELING DOWN, DEPRESSED OR HOPELESS: 0
SUM OF ALL RESPONSES TO PHQ QUESTIONS 1-9: 0
1. LITTLE INTEREST OR PLEASURE IN DOING THINGS: 0

## 2023-03-07 NOTE — PROGRESS NOTES
[facility]  [facility]  [address]  [facility] [address]  Dept: [phone]    Lu Kim is a 27 y.o. female Established patient, who presents today for her medical conditions/complaints as noted below.      Chief Complaint   Patient presents with    Migraine     Medication follow up Lexapro 10 mg   Patient declines vaccine today        HPI:     HPI: The patient is here for medication check. She is doing well with lexapro. She had BC switched to a progesterone only pill for migraines these have improved since the switch.     Reviewed prior notes None  Reviewed previous Labs    LDL Cholesterol (mg/dL)   Date Value   2020 86       (goal LDL is <100)   AST (U/L)   Date Value   2020 18     ALT (U/L)   Date Value   2020 18     BUN (mg/dL)   Date Value   2020 8     Hemoglobin A1C (%)   Date Value   2018 4.8     TSH (mIU/L)   Date Value   2021 1.56     BP Readings from Last 3 Encounters:   23 116/84   23 122/86   22 118/82          (goal 120/80)  Hemoglobin A1C   Date Value Ref Range Status   2018 4.8 4.0 - 6.0 % Final     Past Medical History:   Diagnosis Date    Anxiety and depression 3/7/2023    Headache     History of chlamydia     Hyperglycemia     Obesity       Past Surgical History:   Procedure Laterality Date     SECTION      x 2        Family History   Problem Relation Age of Onset    Depression Mother     Anxiety Disorder Mother     Diabetes Paternal Grandfather     Diabetes Paternal Grandmother     Diabetes Maternal Grandmother     COPD Maternal Grandmother     Hypertension Maternal Grandmother     Diabetes Maternal Grandfather     Coronary Art Dis Maternal Grandfather     Breast Cancer Neg Hx     Cancer Neg Hx     Colon Cancer Neg Hx     Eclampsia Neg Hx     Ovarian Cancer Neg Hx      Labor Neg Hx     Spont Abortions Neg Hx     Stroke Neg Hx        Social History     Tobacco Use     Smoking status: Never    Smokeless tobacco: Never   Substance Use Topics    Alcohol use: Yes     Comment: social      Current Outpatient Medications   Medication Sig Dispense Refill    escitalopram (LEXAPRO) 10 MG tablet Take 1 tablet by mouth daily 90 tablet 3    norethindrone (ORTHO MICRONOR) 0.35 MG tablet Take 1 tablet by mouth daily 28 tablet 11    magnesium oxide (MAG-OX) 400 MG tablet take 1 tablet by mouth once daily 30 tablet 2    vitamin B-2 (RIBOFLAVIN) 100 MG TABS tablet take 1 tablet by mouth once daily 30 tablet 2    Omega-3 Fatty Acids (FISH OIL ADULT GUMMIES PO) Take by mouth      Probiotic Product (PROBIOTIC DAILY) CAPS Take 1 capsule by mouth daily. 30 capsule 2    Multiple Vitamins-Calcium (ONE-A-DAY WOMENS PO) Take by mouth       No current facility-administered medications for this visit. No Known Allergies    Health Maintenance   Topic Date Due    COVID-19 Vaccine (1) Never done    Flu vaccine (1) 08/01/2022    Depression Screen  03/07/2024    Pap smear  01/12/2026    DTaP/Tdap/Td vaccine (7 - Td or Tdap) 02/09/2028    Varicella vaccine  Completed    Hepatitis C screen  Completed    HIV screen  Completed    Hepatitis A vaccine  Aged Out    Hib vaccine  Aged Out    Meningococcal (ACWY) vaccine  Aged Out    Pneumococcal 0-64 years Vaccine  Aged Out       Subjective:      Review of Systems   Constitutional:  Negative for chills, fever and unexpected weight change. HENT:  Negative for congestion, hearing loss, rhinorrhea, sinus pressure and sore throat. Eyes:  Negative for photophobia and visual disturbance. Respiratory:  Negative for cough, chest tightness and shortness of breath. Cardiovascular:  Negative for chest pain, palpitations and leg swelling. Genitourinary:  Negative for decreased urine volume, difficulty urinating, frequency and urgency. Musculoskeletal:  Negative for arthralgias, gait problem and myalgias. Skin:  Negative for rash.    Neurological:  Negative for dizziness, weakness, numbness and headaches. Hematological:  Negative for adenopathy. Psychiatric/Behavioral:  Negative for dysphoric mood, sleep disturbance and suicidal ideas. The patient is not nervous/anxious. Objective:     /84   Pulse 89   Ht 5' 3\" (1.6 m)   Wt 259 lb (117.5 kg)   SpO2 98%   BMI 45.88 kg/m²   Physical Exam  Constitutional:       General: She is not in acute distress. Appearance: Normal appearance. She is obese. She is not ill-appearing. HENT:      Head: Normocephalic and atraumatic. Right Ear: External ear normal.      Left Ear: External ear normal.      Nose: Nose normal.      Mouth/Throat:      Mouth: Mucous membranes are moist.   Neck:      Vascular: No carotid bruit. Cardiovascular:      Rate and Rhythm: Normal rate and regular rhythm. Pulses: Normal pulses. Heart sounds: Normal heart sounds. Pulmonary:      Effort: Pulmonary effort is normal. No respiratory distress. Breath sounds: Normal breath sounds. Musculoskeletal:         General: Normal range of motion. Cervical back: Normal range of motion and neck supple. Lymphadenopathy:      Cervical: No cervical adenopathy. Skin:     General: Skin is warm and dry. Neurological:      General: No focal deficit present. Mental Status: She is alert and oriented to person, place, and time. Psychiatric:         Mood and Affect: Mood normal.         Behavior: Behavior normal.         Thought Content: Thought content normal.       Assessment and Plan:          1. Class 3 severe obesity without serious comorbidity with body mass index (BMI) of 45.0 to 49.9 in adult, unspecified obesity type (HCC)  -     CBC with Auto Differential; Future  -     Comprehensive Metabolic Panel; Future  -     TSH With Reflex Ft4; Future  -     Lipid, Fasting; Future  2. Anxiety and depression  -     escitalopram (LEXAPRO) 10 MG tablet;  Take 1 tablet by mouth daily, Disp-90 tablet, R-3Normal  -     CBC with Auto Differential; Future  -     Comprehensive Metabolic Panel; Future  -     TSH With Reflex Ft4; Future  -     Lipid, Fasting; Future  3. Intractable migraine without aura and without status migrainosus  -     CBC with Auto Differential; Future  -     Comprehensive Metabolic Panel; Future  -     TSH With Reflex Ft4; Future  -     Lipid, Fasting; Future           Return in about 6 months (around 9/7/2023) for Follow up if symptoms persist or worsen. Patient given educational materials - see patient instructions. Discussed use, benefit, and side effects of prescribed medications. All patient questions answered. Pt voiced understanding. Reviewed health maintenance. Instructed to continue current medications, diet and exercise. Patient agreed with treatment plan. Follow up as directed.      Electronically signed by KACI Tejada on 3/7/2023 at 10:30 AM

## 2023-07-27 DIAGNOSIS — F41.9 ANXIETY AND DEPRESSION: ICD-10-CM

## 2023-07-27 DIAGNOSIS — F32.A ANXIETY AND DEPRESSION: ICD-10-CM

## 2023-07-27 RX ORDER — ESCITALOPRAM OXALATE 10 MG/1
10 TABLET ORAL DAILY
Qty: 90 TABLET | Refills: 3 | Status: SHIPPED | OUTPATIENT
Start: 2023-07-27

## 2023-07-27 NOTE — TELEPHONE ENCOUNTER
LAST VISIT:   3/7/2023     Future Appointments   Date Time Provider 4600  [address]   3/11/2024  1:00 PM KACI Madden

## 2024-02-07 ENCOUNTER — HOSPITAL ENCOUNTER (OUTPATIENT)
Age: 29
Setting detail: SPECIMEN
Discharge: HOME | End: 2024-02-07

## 2024-02-07 ENCOUNTER — OFFICE VISIT (OUTPATIENT)
Dept: OBGYN CLINIC | Age: 29
End: 2024-02-07
Payer: COMMERCIAL

## 2024-02-07 VITALS
WEIGHT: 263 LBS | SYSTOLIC BLOOD PRESSURE: 118 MMHG | HEIGHT: 63 IN | BODY MASS INDEX: 46.6 KG/M2 | DIASTOLIC BLOOD PRESSURE: 82 MMHG

## 2024-02-07 DIAGNOSIS — Z11.3 SCREEN FOR STD (SEXUALLY TRANSMITTED DISEASE): ICD-10-CM

## 2024-02-07 DIAGNOSIS — Z11.3 SCREEN FOR STD (SEXUALLY TRANSMITTED DISEASE): Primary | ICD-10-CM

## 2024-02-07 LAB
CANDIDA SPECIES: NEGATIVE
GARDNERELLA VAGINALIS: POSITIVE
SOURCE: ABNORMAL
TRICHOMONAS: NEGATIVE

## 2024-02-07 PROCEDURE — 99213 OFFICE O/P EST LOW 20 MIN: CPT | Performed by: NURSE PRACTITIONER

## 2024-02-07 RX ORDER — METRONIDAZOLE 500 MG/1
500 TABLET ORAL 2 TIMES DAILY
Qty: 14 TABLET | Refills: 0 | Status: SHIPPED | OUTPATIENT
Start: 2024-02-07 | End: 2024-02-14

## 2024-02-07 NOTE — PROGRESS NOTES
Lu Kim  2024    YOB: 1995          The patient was seen today. She is here regarding STD exposure to trich. . Her bowels are regular and she is voiding without difficulty.     HPI:  Lu Kim is a 28 y.o. female  STD exposure       OB History    Para Term  AB Living   2 2 2 0 0 2   SAB IAB Ectopic Molar Multiple Live Births   0 0 0 0 0 2      # Outcome Date GA Lbr Jf/2nd Weight Sex Delivery Anes PTL Lv   2 Term 14 38w2d  3.161 kg (6 lb 15.5 oz) M CS-LTranv Spinal N ASHLEY      Birth Comments: (+) GBS, low platelet count, excessive fetal growth      Apgar1: 9  Apgar5: 9   1 Term 12 39w3d  3.374 kg (7 lb 7 oz) F CS-LTranv   ASHLEY      Birth Comments: System Generated. Please review and update pregnancy details.      Name: Laury       Past Medical History:   Diagnosis Date    Anxiety and depression 3/7/2023    Headache     History of chlamydia     Hyperglycemia     Obesity        Past Surgical History:   Procedure Laterality Date     SECTION      x 2        Family History   Problem Relation Age of Onset    Depression Mother     Anxiety Disorder Mother     Diabetes Paternal Grandfather     Diabetes Paternal Grandmother     Diabetes Maternal Grandmother     COPD Maternal Grandmother     Hypertension Maternal Grandmother     Diabetes Maternal Grandfather     Coronary Art Dis Maternal Grandfather     Breast Cancer Neg Hx     Cancer Neg Hx     Colon Cancer Neg Hx     Eclampsia Neg Hx     Ovarian Cancer Neg Hx      Labor Neg Hx     Spont Abortions Neg Hx     Stroke Neg Hx        Social History     Socioeconomic History    Marital status: Single     Spouse name: Not on file    Number of children: Not on file    Years of education: Not on file    Highest education level: Not on file   Occupational History    Not on file   Tobacco Use    Smoking status: Never    Smokeless tobacco: Never   Vaping Use    Vaping Use: Never used   Substance and

## 2024-02-08 ENCOUNTER — TELEPHONE (OUTPATIENT)
Dept: OBGYN CLINIC | Age: 29
End: 2024-02-08

## 2024-02-08 LAB
C TRACH DNA SPEC QL PROBE+SIG AMP: NEGATIVE
N GONORRHOEA DNA SPEC QL PROBE+SIG AMP: NEGATIVE
SPECIMEN DESCRIPTION: NORMAL

## 2024-02-08 NOTE — TELEPHONE ENCOUNTER
Per Ivone NP, pt notified of +BV; pt RX for flagyl sent to pt pharmacy previously.  Pt inquiring about partner had +trich.  Notified of trich being negative; but flagyl is used to treat.  Notified if s/sx could return to office for cultures.  Pt verbalized understanding.

## 2024-02-08 NOTE — TELEPHONE ENCOUNTER
----- Message from EVIN Maravilla NP sent at 2/8/2024  8:08 AM EST -----  + BV  Flagyl 500mg PO BID x 7 days   Gc/CT pending

## 2024-02-10 ENCOUNTER — PATIENT MESSAGE (OUTPATIENT)
Dept: OBGYN CLINIC | Age: 29
End: 2024-02-10

## 2024-02-12 RX ORDER — VALACYCLOVIR HYDROCHLORIDE 500 MG/1
500 TABLET, FILM COATED ORAL 2 TIMES DAILY
Qty: 20 TABLET | Refills: 1 | Status: SHIPPED | OUTPATIENT
Start: 2024-02-12 | End: 2024-02-22

## 2024-02-21 ENCOUNTER — HOSPITAL ENCOUNTER (OUTPATIENT)
Age: 29
Setting detail: SPECIMEN
Discharge: HOME | End: 2024-02-21

## 2024-02-21 ENCOUNTER — OFFICE VISIT (OUTPATIENT)
Dept: OBGYN CLINIC | Age: 29
End: 2024-02-21
Payer: COMMERCIAL

## 2024-02-21 VITALS
DIASTOLIC BLOOD PRESSURE: 74 MMHG | BODY MASS INDEX: 46.6 KG/M2 | HEIGHT: 63 IN | WEIGHT: 263 LBS | SYSTOLIC BLOOD PRESSURE: 122 MMHG

## 2024-02-21 DIAGNOSIS — Z01.419 WELL FEMALE EXAM WITH ROUTINE GYNECOLOGICAL EXAM: Primary | ICD-10-CM

## 2024-02-21 DIAGNOSIS — N76.0 ACUTE VAGINITIS: ICD-10-CM

## 2024-02-21 DIAGNOSIS — Z11.51 SPECIAL SCREENING EXAMINATION FOR HUMAN PAPILLOMAVIRUS (HPV): ICD-10-CM

## 2024-02-21 LAB
CANDIDA SPECIES: NEGATIVE
GARDNERELLA VAGINALIS: POSITIVE
SOURCE: ABNORMAL
TRICHOMONAS: NEGATIVE

## 2024-02-21 PROCEDURE — 99395 PREV VISIT EST AGE 18-39: CPT | Performed by: NURSE PRACTITIONER

## 2024-02-21 RX ORDER — ASCORBIC ACID, CHOLECALCIFEROL, .ALPHA.-TOCOPHEROL ACETATE, DL-, PYRIDOXINE, FOLIC ACID, CYANOCOBALAMIN, CALCIUM, FERROUS FUMARATE, MAGNESIUM, DOCONEXENT 85; 200; 10; 25; 1; 12; 140; 27; 45; 300 [IU]/1; [IU]/1; [IU]/1; [IU]/1; MG/1; UG/1; MG/1; MG/1; MG/1; MG/1
1 CAPSULE, GELATIN COATED ORAL DAILY
Qty: 30 CAPSULE | Refills: 11 | Status: SHIPPED | OUTPATIENT
Start: 2024-02-21 | End: 2025-02-20

## 2024-02-21 NOTE — PROGRESS NOTES
review)    Clinical History  Oral Contraceptives  Z01.419 Routine gyn exam without abnormal findings  High risk HPV DNA testing is requested if the diagnosis is abnormal    GYNECOLOGIC CYTOLOGY REPORT    Patient Name: MICHAEL SAENZ  [facility] Rec: [id number]  Path Number: [id number]  [facility]  [employer]  [facility]  ANATOMIC PATHOLOGY  [address] [place] [address].  [place], [place] [address] [phone]  Fax: [phone]     ]  Abnormal Pap Smear History: denies  Colposcopy History:   Date of Last Mammogram: NA  Date of Last Colonoscopy:   Date of Last Bone Density:      ________________________________________________________________________        REVIEW OF SYSTEMS:    see problem lsit    A minimum of an eleven point review of systems was completed.    Review Of Systems (11 point):  Constitutional: No fever, chills or malaise; No weight change or fatigue  Head and Eyes: No vision changes, Headache, Dizziness or trauma in last 12 months  ENT ROS: No hearing, Tinnitis, sinus or taste problems  Hematological and Lymphatic ROS:No Lymphoma, Von Willebrand's, Hemophillia or Bleeding History  Psych ROS: No Depression, Homicidal thoughts,suicidal thoughts, or anxiety  Breast ROS: No breast abnormalities or lumps  Respiratory ROS: No SOB, Pneumoniae,Cough, or Pulmonary Embolism   Cardiovascular ROS: No Chest Pain with Exertion, Palpitations, Syncope, Edema, Arrhythmia  Gastrointestinal ROS: No Indigestion, Heartburn, Nausea, vomiting, Diarrhea, Constipation,or Bowel Changes; No Bloody Stools or melena  Genito-Urinary ROS: No Dysuria, Hematuria or Nocturia. No Urinary Incontinence or Vaginal Discharge  Musculoskeletal ROS: No Arthralgia, Arthritis,Gout,Osteoporosis or Rheumatism  Neurological ROS: No CVA, Migraines, Epilepsy, Seizure Hx, or Limb Weakness  Dermatological ROS: No Rash, Itching, Hives, Mole Changes or Cancer

## 2024-02-22 ENCOUNTER — TELEPHONE (OUTPATIENT)
Dept: OBGYN CLINIC | Age: 29
End: 2024-02-22

## 2024-02-22 RX ORDER — METRONIDAZOLE 500 MG/1
500 TABLET ORAL 2 TIMES DAILY
Qty: 14 TABLET | Refills: 0 | Status: SHIPPED | OUTPATIENT
Start: 2024-02-22 | End: 2024-02-29

## 2024-02-22 NOTE — TELEPHONE ENCOUNTER
----- Message from EVIN Maravilla - NP sent at 2/22/2024  8:05 AM EST -----  + BV  Flagyl 500mg PO BID x 7 days

## 2024-02-24 LAB
HPV I/H RISK 4 DNA CVX QL NAA+PROBE: NOT DETECTED
HPV SAMPLE: NORMAL
HPV, INTERPRETATION: NORMAL
HPV16 DNA CVX QL NAA+PROBE: NOT DETECTED
HPV18 DNA CVX QL NAA+PROBE: NOT DETECTED
SPECIMEN DESCRIPTION: NORMAL

## 2024-03-01 LAB — CYTOLOGY REPORT: NORMAL

## 2024-03-13 ENCOUNTER — OFFICE VISIT (OUTPATIENT)
Dept: PRIMARY CARE CLINIC | Age: 29
End: 2024-03-13
Payer: COMMERCIAL

## 2024-03-13 VITALS
DIASTOLIC BLOOD PRESSURE: 82 MMHG | WEIGHT: 260.2 LBS | OXYGEN SATURATION: 96 % | BODY MASS INDEX: 44.42 KG/M2 | HEART RATE: 86 BPM | HEIGHT: 64 IN | SYSTOLIC BLOOD PRESSURE: 122 MMHG

## 2024-03-13 DIAGNOSIS — F41.9 ANXIETY AND DEPRESSION: ICD-10-CM

## 2024-03-13 DIAGNOSIS — E66.01 CLASS 3 SEVERE OBESITY WITHOUT SERIOUS COMORBIDITY WITH BODY MASS INDEX (BMI) OF 45.0 TO 49.9 IN ADULT, UNSPECIFIED OBESITY TYPE (HCC): ICD-10-CM

## 2024-03-13 DIAGNOSIS — Z00.00 ANNUAL PHYSICAL EXAM: ICD-10-CM

## 2024-03-13 DIAGNOSIS — F32.A ANXIETY AND DEPRESSION: Primary | ICD-10-CM

## 2024-03-13 DIAGNOSIS — F32.A ANXIETY AND DEPRESSION: ICD-10-CM

## 2024-03-13 DIAGNOSIS — F41.9 ANXIETY AND DEPRESSION: Primary | ICD-10-CM

## 2024-03-13 LAB
ABSOLUTE IMMATURE GRANULOCYTE: 0.03 K/UL (ref 0–0.3)
BASOPHILS ABSOLUTE: 0.08 K/UL (ref 0–0.2)
BASOPHILS RELATIVE PERCENT: 1 % (ref 0–2)
EOSINOPHILS ABSOLUTE: 0.2 K/UL (ref 0–0.44)
EOSINOPHILS RELATIVE PERCENT: 3 % (ref 1–4)
HCT VFR BLD CALC: 42.7 % (ref 36.3–47.1)
HEMOGLOBIN: 14 G/DL (ref 11.9–15.1)
IMMATURE GRANULOCYTES: 0 %
LYMPHOCYTES ABSOLUTE: 1.74 K/UL (ref 1.1–3.7)
LYMPHOCYTES RELATIVE PERCENT: 23 % (ref 24–43)
MCH RBC QN AUTO: 27.7 PG (ref 25.2–33.5)
MCHC RBC AUTO-ENTMCNC: 32.8 G/DL (ref 28.4–34.8)
MCV RBC AUTO: 84.4 FL (ref 82.6–102.9)
MONOCYTES ABSOLUTE: 0.67 K/UL (ref 0.1–1.2)
MONOCYTES RELATIVE PERCENT: 9 % (ref 3–12)
NEUTROPHILS ABSOLUTE: 4.98 K/UL (ref 1.5–8.1)
NEUTROPHILS RELATIVE PERCENT: 64 % (ref 36–65)
NRBC AUTOMATED: 0 PER 100 WBC
PDW BLD-RTO: 13.8 % (ref 11.8–14.4)
PLATELET # BLD: 242 K/UL (ref 138–453)
PMV BLD AUTO: 10.9 FL (ref 8.1–13.5)
RBC # BLD: 5.06 M/UL (ref 3.95–5.11)
WBC # BLD: 7.7 K/UL (ref 3.5–11.3)

## 2024-03-13 PROCEDURE — 99395 PREV VISIT EST AGE 18-39: CPT | Performed by: PHYSICIAN ASSISTANT

## 2024-03-13 PROCEDURE — 99173 VISUAL ACUITY SCREEN: CPT | Performed by: PHYSICIAN ASSISTANT

## 2024-03-13 PROCEDURE — G0447 BEHAVIOR COUNSEL OBESITY 15M: HCPCS | Performed by: PHYSICIAN ASSISTANT

## 2024-03-13 SDOH — ECONOMIC STABILITY: INCOME INSECURITY: HOW HARD IS IT FOR YOU TO PAY FOR THE VERY BASICS LIKE FOOD, HOUSING, MEDICAL CARE, AND HEATING?: NOT HARD AT ALL

## 2024-03-13 SDOH — ECONOMIC STABILITY: FOOD INSECURITY: WITHIN THE PAST 12 MONTHS, THE FOOD YOU BOUGHT JUST DIDN'T LAST AND YOU DIDN'T HAVE MONEY TO GET MORE.: NEVER TRUE

## 2024-03-13 SDOH — ECONOMIC STABILITY: FOOD INSECURITY: WITHIN THE PAST 12 MONTHS, YOU WORRIED THAT YOUR FOOD WOULD RUN OUT BEFORE YOU GOT MONEY TO BUY MORE.: NEVER TRUE

## 2024-03-13 SDOH — ECONOMIC STABILITY: HOUSING INSECURITY
IN THE LAST 12 MONTHS, WAS THERE A TIME WHEN YOU DID NOT HAVE A STEADY PLACE TO SLEEP OR SLEPT IN A SHELTER (INCLUDING NOW)?: NO

## 2024-03-13 ASSESSMENT — PATIENT HEALTH QUESTIONNAIRE - PHQ9
7. TROUBLE CONCENTRATING ON THINGS, SUCH AS READING THE NEWSPAPER OR WATCHING TELEVISION: NOT AT ALL
4. FEELING TIRED OR HAVING LITTLE ENERGY: SEVERAL DAYS
SUM OF ALL RESPONSES TO PHQ QUESTIONS 1-9: 4
5. POOR APPETITE OR OVEREATING: NOT AT ALL
3. TROUBLE FALLING OR STAYING ASLEEP: SEVERAL DAYS
SUM OF ALL RESPONSES TO PHQ QUESTIONS 1-9: 4
4. FEELING TIRED OR HAVING LITTLE ENERGY: 1
1. LITTLE INTEREST OR PLEASURE IN DOING THINGS: 1
10. IF YOU CHECKED OFF ANY PROBLEMS, HOW DIFFICULT HAVE THESE PROBLEMS MADE IT FOR YOU TO DO YOUR WORK, TAKE CARE OF THINGS AT HOME, OR GET ALONG WITH OTHER PEOPLE: NOT DIFFICULT AT ALL
SUM OF ALL RESPONSES TO PHQ QUESTIONS 1-9: 4
3. TROUBLE FALLING OR STAYING ASLEEP: 1
SUM OF ALL RESPONSES TO PHQ QUESTIONS 1-9: 4
SUM OF ALL RESPONSES TO PHQ QUESTIONS 1-9: 4
2. FEELING DOWN, DEPRESSED OR HOPELESS: 1
8. MOVING OR SPEAKING SO SLOWLY THAT OTHER PEOPLE COULD HAVE NOTICED. OR THE OPPOSITE - BEING SO FIDGETY OR RESTLESS THAT YOU HAVE BEEN MOVING AROUND A LOT MORE THAN USUAL: NOT AT ALL
SUM OF ALL RESPONSES TO PHQ9 QUESTIONS 1 & 2: 2
9. THOUGHTS THAT YOU WOULD BE BETTER OFF DEAD, OR OF HURTING YOURSELF: 0
6. FEELING BAD ABOUT YOURSELF - OR THAT YOU ARE A FAILURE OR HAVE LET YOURSELF OR YOUR FAMILY DOWN: NOT AT ALL
8. MOVING OR SPEAKING SO SLOWLY THAT OTHER PEOPLE COULD HAVE NOTICED. OR THE OPPOSITE, BEING SO FIGETY OR RESTLESS THAT YOU HAVE BEEN MOVING AROUND A LOT MORE THAN USUAL: 0
10. IF YOU CHECKED OFF ANY PROBLEMS, HOW DIFFICULT HAVE THESE PROBLEMS MADE IT FOR YOU TO DO YOUR WORK, TAKE CARE OF THINGS AT HOME, OR GET ALONG WITH OTHER PEOPLE: 0
6. FEELING BAD ABOUT YOURSELF - OR THAT YOU ARE A FAILURE OR HAVE LET YOURSELF OR YOUR FAMILY DOWN: 0
5. POOR APPETITE OR OVEREATING: 0
9. THOUGHTS THAT YOU WOULD BE BETTER OFF DEAD, OR OF HURTING YOURSELF: NOT AT ALL
7. TROUBLE CONCENTRATING ON THINGS, SUCH AS READING THE NEWSPAPER OR WATCHING TELEVISION: 0
2. FEELING DOWN, DEPRESSED OR HOPELESS: SEVERAL DAYS
1. LITTLE INTEREST OR PLEASURE IN DOING THINGS: SEVERAL DAYS

## 2024-03-13 ASSESSMENT — ENCOUNTER SYMPTOMS
COUGH: 0
ABDOMINAL PAIN: 0
DIARRHEA: 0
SORE THROAT: 0
ABDOMINAL DISTENTION: 0
CHEST TIGHTNESS: 0
CONSTIPATION: 0
SHORTNESS OF BREATH: 0

## 2024-03-13 NOTE — PROGRESS NOTES
[facility]  [facility]  [address] [facility] [address]  Dept: [phone]    Lu Kim is a 28 y.o. female Established patient, who presents today for her medical conditions/complaints as noted below.      Chief Complaint   Patient presents with    Annual Exam     Pt would like to inform you she's starting the Keto diet.       HPI:     HPI: The patient is a pleasant 28-year-old female who presents today with concerns of annual physical exam.  Patient plans to begin keto diet.  Has significant past medical history of morbid obesity BMI 45.25.,  Anxiety and depression for which patient takes Lexapro 10 mg daily. Has tried pill and ozempic was not covered.       Patient was asked about her current diet and exercise habits, and personalized advice was provided regarding recommended lifestyle changes.  Patient's comorbid health conditions associated with elevated BMI were discussed, including dyslipidemia, hyperglycemia, and mood disorder, as well as the likely benefits of weight loss.  Based upon patient's motivation to change her behavior, the following plan was agreed upon to work toward a weight loss goal of 50 pounds: wear a pedometer and get at least 10,000 steps per day, exercise for at least 30 minutes 4-5 days per week, and keto diet . Educational materials for  weight loss were provided.  Patient will follow-up in 6 month(s) with PCP.  Provider spent 15 minutes counseling patient.     Reviewed prior notes None  Reviewed previous Labs    LDL Cholesterol (mg/dL)   Date Value   03/07/2023 95   07/02/2020 86       (goal LDL is <100)   AST (U/L)   Date Value   03/07/2023 28     ALT (U/L)   Date Value   03/07/2023 34 (H)     BUN (mg/dL)   Date Value   03/07/2023 9     Hemoglobin A1C (%)   Date Value   02/09/2018 4.8     TSH (uIU/mL)   Date Value   03/07/2023 1.76     BP Readings from Last 3 Encounters:   03/13/24 122/82   02/21/24 122/74   02/07/24 118/82

## 2024-03-14 LAB
ALBUMIN SERPL-MCNC: 4.3 G/DL (ref 3.5–5.2)
ALBUMIN/GLOBULIN RATIO: 1 (ref 1–2.5)
ALP BLD-CCNC: 63 U/L (ref 35–104)
ALT SERPL-CCNC: 63 U/L (ref 10–35)
ANION GAP SERPL CALCULATED.3IONS-SCNC: 14 MMOL/L (ref 9–16)
AST SERPL-CCNC: 49 U/L (ref 10–35)
BILIRUB SERPL-MCNC: 0.5 MG/DL (ref 0–1.2)
BUN BLDV-MCNC: 12 MG/DL (ref 6–20)
CALCIUM SERPL-MCNC: 9.9 MG/DL (ref 8.6–10.4)
CHLORIDE BLD-SCNC: 104 MMOL/L (ref 98–107)
CHOLESTEROL, FASTING: 171 MG/DL (ref 0–199)
CHOLESTEROL/HDL RATIO: 4
CO2: 22 MMOL/L (ref 20–31)
CREAT SERPL-MCNC: 0.6 MG/DL (ref 0.5–0.9)
ESTIMATED AVERAGE GLUCOSE: 94 MG/DL
GFR SERPL CREATININE-BSD FRML MDRD: >60 ML/MIN/1.73M2
GLUCOSE BLD-MCNC: 101 MG/DL (ref 74–99)
HBA1C MFR BLD: 4.9 % (ref 4–6)
HDLC SERPL-MCNC: 38 MG/DL
LDL CHOLESTEROL: 100 MG/DL (ref 0–100)
POTASSIUM SERPL-SCNC: 4.3 MMOL/L (ref 3.7–5.3)
SODIUM BLD-SCNC: 140 MMOL/L (ref 136–145)
TOTAL PROTEIN: 7.4 G/DL (ref 6.6–8.7)
TRIGLYCERIDE, FASTING: 163 MG/DL (ref 0–149)
TSH SERPL DL<=0.05 MIU/L-ACNC: 2.16 UIU/ML (ref 0.27–4.2)
VLDLC SERPL CALC-MCNC: 33 MG/DL

## 2024-03-14 NOTE — RESULT ENCOUNTER NOTE
Call and advise patient that the results showed elevated liver enzymes likely due to fatty liver.  Patient should increase diet and exercise on with weight loss to help improve these levels.

## 2024-03-18 LAB
COTININE: <5 NG/ML
NICOTINE: <5 NG/ML

## 2024-08-22 DIAGNOSIS — F32.A ANXIETY AND DEPRESSION: ICD-10-CM

## 2024-08-22 DIAGNOSIS — F41.9 ANXIETY AND DEPRESSION: ICD-10-CM

## 2024-08-23 RX ORDER — ESCITALOPRAM OXALATE 10 MG/1
10 TABLET ORAL DAILY
Qty: 90 TABLET | Refills: 0 | Status: SHIPPED | OUTPATIENT
Start: 2024-08-23

## 2024-11-11 DIAGNOSIS — F41.9 ANXIETY AND DEPRESSION: ICD-10-CM

## 2024-11-11 DIAGNOSIS — F32.A ANXIETY AND DEPRESSION: ICD-10-CM

## 2024-11-13 RX ORDER — ESCITALOPRAM OXALATE 10 MG/1
10 TABLET ORAL DAILY
Qty: 30 TABLET | Refills: 1 | Status: SHIPPED | OUTPATIENT
Start: 2024-11-13

## 2025-01-08 DIAGNOSIS — F41.9 ANXIETY AND DEPRESSION: ICD-10-CM

## 2025-01-08 DIAGNOSIS — F32.A ANXIETY AND DEPRESSION: ICD-10-CM

## 2025-01-08 RX ORDER — ESCITALOPRAM OXALATE 10 MG/1
10 TABLET ORAL DAILY
Qty: 30 TABLET | Refills: 1 | Status: SHIPPED | OUTPATIENT
Start: 2025-01-08

## 2025-01-08 NOTE — TELEPHONE ENCOUNTER
LAST VISIT:   3/13/2024     No future appointments.    Pharmacy verified? Yes     [facility] - [place], [place] - [address] [place] [address] Court - P [phone] - F [phone]  [address] [place] [address]  Phone: [phone] Fax: [phone]

## 2025-02-26 DIAGNOSIS — F32.A ANXIETY AND DEPRESSION: ICD-10-CM

## 2025-02-26 DIAGNOSIS — F41.9 ANXIETY AND DEPRESSION: ICD-10-CM

## 2025-03-04 RX ORDER — ESCITALOPRAM OXALATE 10 MG/1
10 TABLET ORAL DAILY
Qty: 30 TABLET | Refills: 1 | OUTPATIENT
Start: 2025-03-04

## 2025-03-20 ENCOUNTER — OFFICE VISIT (OUTPATIENT)
Dept: PRIMARY CARE CLINIC | Age: 30
End: 2025-03-20

## 2025-03-20 VITALS
WEIGHT: 267 LBS | HEIGHT: 63 IN | SYSTOLIC BLOOD PRESSURE: 126 MMHG | BODY MASS INDEX: 47.31 KG/M2 | OXYGEN SATURATION: 98 % | DIASTOLIC BLOOD PRESSURE: 82 MMHG | HEART RATE: 92 BPM

## 2025-03-20 DIAGNOSIS — G43.919 INTRACTABLE MIGRAINE WITHOUT STATUS MIGRAINOSUS, UNSPECIFIED MIGRAINE TYPE: ICD-10-CM

## 2025-03-20 DIAGNOSIS — E66.01 CLASS 3 SEVERE OBESITY WITHOUT SERIOUS COMORBIDITY WITH BODY MASS INDEX (BMI) OF 45.0 TO 49.9 IN ADULT, UNSPECIFIED OBESITY TYPE: Primary | ICD-10-CM

## 2025-03-20 DIAGNOSIS — E66.813 CLASS 3 SEVERE OBESITY WITHOUT SERIOUS COMORBIDITY WITH BODY MASS INDEX (BMI) OF 45.0 TO 49.9 IN ADULT, UNSPECIFIED OBESITY TYPE: Primary | ICD-10-CM

## 2025-03-20 DIAGNOSIS — B00.9 HSV-1 INFECTION: ICD-10-CM

## 2025-03-20 DIAGNOSIS — E66.01 CLASS 3 SEVERE OBESITY WITHOUT SERIOUS COMORBIDITY WITH BODY MASS INDEX (BMI) OF 45.0 TO 49.9 IN ADULT, UNSPECIFIED OBESITY TYPE: ICD-10-CM

## 2025-03-20 DIAGNOSIS — F32.A ANXIETY AND DEPRESSION: ICD-10-CM

## 2025-03-20 DIAGNOSIS — E66.813 CLASS 3 SEVERE OBESITY WITHOUT SERIOUS COMORBIDITY WITH BODY MASS INDEX (BMI) OF 45.0 TO 49.9 IN ADULT, UNSPECIFIED OBESITY TYPE: ICD-10-CM

## 2025-03-20 DIAGNOSIS — F41.9 ANXIETY AND DEPRESSION: ICD-10-CM

## 2025-03-20 LAB
ALBUMIN/GLOBULIN RATIO: 1.6 (ref 1–2.5)
ALBUMIN: 4.2 G/DL (ref 3.5–5.2)
ALP BLD-CCNC: 82 U/L (ref 35–104)
ALT SERPL-CCNC: 43 U/L (ref 10–35)
ANION GAP SERPL CALCULATED.3IONS-SCNC: 11 MMOL/L (ref 9–16)
AST SERPL-CCNC: 34 U/L (ref 10–35)
BASOPHILS ABSOLUTE: 0.07 K/UL (ref 0–0.2)
BASOPHILS RELATIVE PERCENT: 1 % (ref 0–2)
BILIRUB SERPL-MCNC: 0.3 MG/DL (ref 0–1.2)
BUN BLDV-MCNC: 11 MG/DL (ref 6–20)
CALCIUM SERPL-MCNC: 9.3 MG/DL (ref 8.6–10.4)
CHLORIDE BLD-SCNC: 106 MMOL/L (ref 98–107)
CHOLESTEROL, FASTING: 153 MG/DL (ref 0–199)
CHOLESTEROL/HDL RATIO: 4.3
CO2: 23 MMOL/L (ref 20–31)
CREAT SERPL-MCNC: 0.5 MG/DL (ref 0.6–0.9)
EOSINOPHILS ABSOLUTE: 0.23 K/UL (ref 0–0.44)
EOSINOPHILS RELATIVE PERCENT: 3 % (ref 1–4)
GFR, ESTIMATED: >90 ML/MIN/1.73M2
GLUCOSE BLD-MCNC: 101 MG/DL (ref 74–99)
HCT VFR BLD CALC: 42.1 % (ref 36.3–47.1)
HDLC SERPL-MCNC: 36 MG/DL
HEMOGLOBIN: 13.3 G/DL (ref 11.9–15.1)
IMMATURE GRANULOCYTES %: 0 %
IMMATURE GRANULOCYTES ABSOLUTE: 0.04 K/UL (ref 0–0.3)
LDL CHOLESTEROL: 80 MG/DL (ref 0–100)
LYMPHOCYTES ABSOLUTE: 2.1 K/UL (ref 1.1–3.7)
LYMPHOCYTES RELATIVE PERCENT: 23 % (ref 24–43)
MCH RBC QN AUTO: 26.3 PG (ref 25.2–33.5)
MCHC RBC AUTO-ENTMCNC: 31.6 G/DL (ref 28.4–34.8)
MCV RBC AUTO: 83.4 FL (ref 82.6–102.9)
MONOCYTES ABSOLUTE: 0.62 K/UL (ref 0.1–1.2)
MONOCYTES RELATIVE PERCENT: 7 % (ref 3–12)
NEUTROPHILS ABSOLUTE: 6.05 K/UL (ref 1.5–8.1)
NEUTROPHILS RELATIVE PERCENT: 66 % (ref 36–65)
NRBC AUTOMATED: 0 PER 100 WBC
PDW BLD-RTO: 13.7 % (ref 11.8–14.4)
PLATELET # BLD: 246 K/UL (ref 138–453)
PMV BLD AUTO: 10.4 FL (ref 8.1–13.5)
POTASSIUM SERPL-SCNC: 4.3 MMOL/L (ref 3.7–5.3)
RBC # BLD: 5.05 M/UL (ref 3.95–5.11)
SODIUM BLD-SCNC: 140 MMOL/L (ref 136–145)
TOTAL PROTEIN: 6.9 G/DL (ref 6.6–8.7)
TRIGLYCERIDE, FASTING: 186 MG/DL (ref 0–149)
VLDLC SERPL CALC-MCNC: 37 MG/DL (ref 1–30)
WBC # BLD: 9.1 K/UL (ref 3.5–11.3)

## 2025-03-20 RX ORDER — BUPROPION HYDROCHLORIDE 150 MG/1
150 TABLET ORAL EVERY MORNING
Qty: 30 TABLET | Refills: 3 | Status: SHIPPED | OUTPATIENT
Start: 2025-03-20

## 2025-03-20 RX ORDER — ESCITALOPRAM OXALATE 10 MG/1
10 TABLET ORAL DAILY
Qty: 30 TABLET | Refills: 1 | Status: SHIPPED | OUTPATIENT
Start: 2025-03-20

## 2025-03-20 SDOH — ECONOMIC STABILITY: FOOD INSECURITY: WITHIN THE PAST 12 MONTHS, THE FOOD YOU BOUGHT JUST DIDN'T LAST AND YOU DIDN'T HAVE MONEY TO GET MORE.: NEVER TRUE

## 2025-03-20 SDOH — ECONOMIC STABILITY: FOOD INSECURITY: WITHIN THE PAST 12 MONTHS, YOU WORRIED THAT YOUR FOOD WOULD RUN OUT BEFORE YOU GOT MONEY TO BUY MORE.: NEVER TRUE

## 2025-03-20 SDOH — ECONOMIC STABILITY: TRANSPORTATION INSECURITY
IN THE PAST 12 MONTHS, HAS THE LACK OF TRANSPORTATION KEPT YOU FROM MEDICAL APPOINTMENTS OR FROM GETTING MEDICATIONS?: NO

## 2025-03-20 SDOH — ECONOMIC STABILITY: INCOME INSECURITY: IN THE LAST 12 MONTHS, WAS THERE A TIME WHEN YOU WERE NOT ABLE TO PAY THE MORTGAGE OR RENT ON TIME?: NO

## 2025-03-20 SDOH — ECONOMIC STABILITY: TRANSPORTATION INSECURITY
IN THE PAST 12 MONTHS, HAS LACK OF TRANSPORTATION KEPT YOU FROM MEETINGS, WORK, OR FROM GETTING THINGS NEEDED FOR DAILY LIVING?: NO

## 2025-03-20 ASSESSMENT — PATIENT HEALTH QUESTIONNAIRE - PHQ9
8. MOVING OR SPEAKING SO SLOWLY THAT OTHER PEOPLE COULD HAVE NOTICED. OR THE OPPOSITE - BEING SO FIDGETY OR RESTLESS THAT YOU HAVE BEEN MOVING AROUND A LOT MORE THAN USUAL: NOT AT ALL
1. LITTLE INTEREST OR PLEASURE IN DOING THINGS: SEVERAL DAYS
SUM OF ALL RESPONSES TO PHQ QUESTIONS 1-9: 5
3. TROUBLE FALLING OR STAYING ASLEEP: SEVERAL DAYS
SUM OF ALL RESPONSES TO PHQ QUESTIONS 1-9: 5
10. IF YOU CHECKED OFF ANY PROBLEMS, HOW DIFFICULT HAVE THESE PROBLEMS MADE IT FOR YOU TO DO YOUR WORK, TAKE CARE OF THINGS AT HOME, OR GET ALONG WITH OTHER PEOPLE: SOMEWHAT DIFFICULT
2. FEELING DOWN, DEPRESSED OR HOPELESS: SEVERAL DAYS
10. IF YOU CHECKED OFF ANY PROBLEMS, HOW DIFFICULT HAVE THESE PROBLEMS MADE IT FOR YOU TO DO YOUR WORK, TAKE CARE OF THINGS AT HOME, OR GET ALONG WITH OTHER PEOPLE: SOMEWHAT DIFFICULT
1. LITTLE INTEREST OR PLEASURE IN DOING THINGS: SEVERAL DAYS
SUM OF ALL RESPONSES TO PHQ QUESTIONS 1-9: 5
5. POOR APPETITE OR OVEREATING: SEVERAL DAYS
SUM OF ALL RESPONSES TO PHQ QUESTIONS 1-9: 5
5. POOR APPETITE OR OVEREATING: SEVERAL DAYS
4. FEELING TIRED OR HAVING LITTLE ENERGY: SEVERAL DAYS
2. FEELING DOWN, DEPRESSED OR HOPELESS: SEVERAL DAYS
7. TROUBLE CONCENTRATING ON THINGS, SUCH AS READING THE NEWSPAPER OR WATCHING TELEVISION: NOT AT ALL
9. THOUGHTS THAT YOU WOULD BE BETTER OFF DEAD, OR OF HURTING YOURSELF: NOT AT ALL
9. THOUGHTS THAT YOU WOULD BE BETTER OFF DEAD, OR OF HURTING YOURSELF: NOT AT ALL
6. FEELING BAD ABOUT YOURSELF - OR THAT YOU ARE A FAILURE OR HAVE LET YOURSELF OR YOUR FAMILY DOWN: NOT AT ALL
7. TROUBLE CONCENTRATING ON THINGS, SUCH AS READING THE NEWSPAPER OR WATCHING TELEVISION: NOT AT ALL
3. TROUBLE FALLING OR STAYING ASLEEP: SEVERAL DAYS
SUM OF ALL RESPONSES TO PHQ QUESTIONS 1-9: 5
6. FEELING BAD ABOUT YOURSELF - OR THAT YOU ARE A FAILURE OR HAVE LET YOURSELF OR YOUR FAMILY DOWN: NOT AT ALL
4. FEELING TIRED OR HAVING LITTLE ENERGY: SEVERAL DAYS
8. MOVING OR SPEAKING SO SLOWLY THAT OTHER PEOPLE COULD HAVE NOTICED. OR THE OPPOSITE, BEING SO FIGETY OR RESTLESS THAT YOU HAVE BEEN MOVING AROUND A LOT MORE THAN USUAL: NOT AT ALL

## 2025-03-20 ASSESSMENT — ENCOUNTER SYMPTOMS
CHEST TIGHTNESS: 0
COUGH: 0
CONSTIPATION: 0
SORE THROAT: 0
SHORTNESS OF BREATH: 0
ABDOMINAL DISTENTION: 0
DIARRHEA: 0
ABDOMINAL PAIN: 0

## 2025-03-20 NOTE — PROGRESS NOTES
Future  3. Intractable migraine without status migrainosus, unspecified migraine type  -     Lipid, Fasting; Future  -     CBC with Auto Differential; Future  -     Comprehensive Metabolic Panel; Future  4. Anxiety and depression  -     Lipid, Fasting; Future  -     CBC with Auto Differential; Future  -     Comprehensive Metabolic Panel; Future  -     buPROPion (WELLBUTRIN XL) 150 MG extended release tablet; Take 1 tablet by mouth every morning, Disp-30 tablet, R-3Normal  -     [facility])  -     escitalopram (LEXAPRO) 10 MG tablet; Take 1 tablet by mouth daily, Disp-30 tablet, R-1Please renew this medication by 01/09/2025 to prevent a break in patient therapy.Normal         Results       Return in about 2 months (around 5/20/2025) for med check.     The patient (or guardian, if applicable) and other individuals in attendance with the patient were advised that Artificial Intelligence will be utilized during this visit to record, process the conversation to generate a clinical note, and support improvement of the AI technology. The patient (or guardian, if applicable) and other individuals in attendance at the appointment consented to the use of AI, including the recording.      Patient given educational materials - see patient instructions.  Discussed use, benefit, and side effects of prescribed medications.  All patient questions answered. Pt voiced understanding. Reviewed health maintenance.  Instructed to continue current medications, diet and exercise.  Patient agreed with treatment plan. Follow up as directed.     Electronically signed by KACI Mann on 3/20/2025 at 9:32 AM

## 2025-03-21 ENCOUNTER — RESULTS FOLLOW-UP (OUTPATIENT)
Dept: PRIMARY CARE CLINIC | Age: 30
End: 2025-03-21

## 2025-04-09 ENCOUNTER — PATIENT MESSAGE (OUTPATIENT)
Dept: PRIMARY CARE CLINIC | Age: 30
End: 2025-04-09

## 2025-04-26 DIAGNOSIS — F32.A ANXIETY AND DEPRESSION: ICD-10-CM

## 2025-04-26 DIAGNOSIS — F41.9 ANXIETY AND DEPRESSION: ICD-10-CM

## 2025-04-28 RX ORDER — ESCITALOPRAM OXALATE 10 MG/1
10 TABLET ORAL DAILY
Qty: 90 TABLET | Refills: 0 | Status: SHIPPED | OUTPATIENT
Start: 2025-04-28 | End: 2025-04-29 | Stop reason: SDUPTHER

## 2025-04-29 DIAGNOSIS — F32.A ANXIETY AND DEPRESSION: ICD-10-CM

## 2025-04-29 DIAGNOSIS — F41.9 ANXIETY AND DEPRESSION: ICD-10-CM

## 2025-04-30 RX ORDER — ESCITALOPRAM OXALATE 10 MG/1
10 TABLET ORAL DAILY
Qty: 5 TABLET | Refills: 0 | Status: SHIPPED | OUTPATIENT
Start: 2025-04-30

## 2025-04-30 NOTE — TELEPHONE ENCOUNTER
LAST VISIT:   3/20/2025     No future appointments.    Pharmacy verified? Yes       [id number] - [place], [place] - [address] -  [phone] - F [phone]  [facility] [facility] [address]  Phone: [phone] Fax: [phone]

## 2025-05-21 DIAGNOSIS — F41.9 ANXIETY AND DEPRESSION: ICD-10-CM

## 2025-05-21 DIAGNOSIS — F32.A ANXIETY AND DEPRESSION: ICD-10-CM

## 2025-05-22 RX ORDER — ESCITALOPRAM OXALATE 10 MG/1
10 TABLET ORAL DAILY
Qty: 30 TABLET | Refills: 0 | Status: SHIPPED | OUTPATIENT
Start: 2025-05-22

## 2025-06-16 DIAGNOSIS — F41.9 ANXIETY AND DEPRESSION: ICD-10-CM

## 2025-06-16 DIAGNOSIS — F32.A ANXIETY AND DEPRESSION: ICD-10-CM

## 2025-06-17 RX ORDER — ESCITALOPRAM OXALATE 10 MG/1
10 TABLET ORAL DAILY
Qty: 30 TABLET | Refills: 0 | Status: SHIPPED | OUTPATIENT
Start: 2025-06-17

## 2025-07-11 DIAGNOSIS — F32.A ANXIETY AND DEPRESSION: ICD-10-CM

## 2025-07-11 DIAGNOSIS — F41.9 ANXIETY AND DEPRESSION: ICD-10-CM

## 2025-07-11 RX ORDER — ESCITALOPRAM OXALATE 10 MG/1
10 TABLET ORAL DAILY
Qty: 30 TABLET | Refills: 0 | Status: SHIPPED | OUTPATIENT
Start: 2025-07-11

## 2025-08-05 DIAGNOSIS — F41.9 ANXIETY AND DEPRESSION: ICD-10-CM

## 2025-08-05 DIAGNOSIS — F32.A ANXIETY AND DEPRESSION: ICD-10-CM

## 2025-08-05 RX ORDER — ESCITALOPRAM OXALATE 10 MG/1
10 TABLET ORAL DAILY
Qty: 30 TABLET | Refills: 0 | Status: SHIPPED | OUTPATIENT
Start: 2025-08-05

## 2025-08-11 ENCOUNTER — HOSPITAL ENCOUNTER (OUTPATIENT)
Age: 30
Setting detail: SPECIMEN
Discharge: HOME | End: 2025-08-11

## 2025-08-11 ENCOUNTER — OFFICE VISIT (OUTPATIENT)
Dept: OBGYN CLINIC | Age: 30
End: 2025-08-11
Payer: COMMERCIAL

## 2025-08-11 VITALS
SYSTOLIC BLOOD PRESSURE: 118 MMHG | BODY MASS INDEX: 47.31 KG/M2 | WEIGHT: 267 LBS | DIASTOLIC BLOOD PRESSURE: 76 MMHG | HEIGHT: 63 IN

## 2025-08-11 DIAGNOSIS — N89.8 VAGINAL ITCHING: ICD-10-CM

## 2025-08-11 DIAGNOSIS — N76.0 ACUTE VAGINITIS: Primary | ICD-10-CM

## 2025-08-11 DIAGNOSIS — N89.8 VAGINAL LESION: ICD-10-CM

## 2025-08-11 PROCEDURE — 99213 OFFICE O/P EST LOW 20 MIN: CPT | Performed by: NURSE PRACTITIONER

## 2025-08-12 ENCOUNTER — TELEPHONE (OUTPATIENT)
Dept: OBGYN CLINIC | Age: 30
End: 2025-08-12

## 2025-08-12 ENCOUNTER — OFFICE VISIT (OUTPATIENT)
Dept: OBGYN CLINIC | Age: 30
End: 2025-08-12
Payer: COMMERCIAL

## 2025-08-12 ENCOUNTER — HOSPITAL ENCOUNTER (OUTPATIENT)
Age: 30
Setting detail: SPECIMEN
Discharge: HOME | End: 2025-08-12

## 2025-08-12 VITALS
SYSTOLIC BLOOD PRESSURE: 116 MMHG | WEIGHT: 267 LBS | DIASTOLIC BLOOD PRESSURE: 68 MMHG | BODY MASS INDEX: 47.31 KG/M2 | HEIGHT: 63 IN

## 2025-08-12 DIAGNOSIS — N76.0 ACUTE VAGINITIS: ICD-10-CM

## 2025-08-12 DIAGNOSIS — N76.0 ACUTE VAGINITIS: Primary | ICD-10-CM

## 2025-08-12 DIAGNOSIS — N89.8 VAGINAL IRRITATION: ICD-10-CM

## 2025-08-12 PROCEDURE — 99213 OFFICE O/P EST LOW 20 MIN: CPT | Performed by: NURSE PRACTITIONER

## 2025-08-13 ENCOUNTER — RESULTS FOLLOW-UP (OUTPATIENT)
Dept: OBGYN CLINIC | Age: 30
End: 2025-08-13

## 2025-08-13 PROBLEM — A59.01 TRICHOMONAS VAGINITIS: Status: ACTIVE | Noted: 2025-08-13

## 2025-08-13 LAB
CANDIDA SPECIES: NEGATIVE
GARDNERELLA VAGINALIS: POSITIVE
SOURCE: ABNORMAL
TRICHOMONAS: POSITIVE

## 2025-08-13 RX ORDER — METRONIDAZOLE 500 MG/1
500 TABLET ORAL 2 TIMES DAILY
Qty: 14 TABLET | Refills: 0 | Status: SHIPPED | OUTPATIENT
Start: 2025-08-13 | End: 2025-08-20

## 2025-09-03 DIAGNOSIS — F41.9 ANXIETY AND DEPRESSION: ICD-10-CM

## 2025-09-03 DIAGNOSIS — F32.A ANXIETY AND DEPRESSION: ICD-10-CM

## 2025-09-04 RX ORDER — ESCITALOPRAM OXALATE 10 MG/1
10 TABLET ORAL DAILY
Qty: 30 TABLET | Refills: 0 | Status: SHIPPED | OUTPATIENT
Start: 2025-09-04